# Patient Record
Sex: FEMALE | Race: WHITE | NOT HISPANIC OR LATINO | Employment: UNEMPLOYED | URBAN - METROPOLITAN AREA
[De-identification: names, ages, dates, MRNs, and addresses within clinical notes are randomized per-mention and may not be internally consistent; named-entity substitution may affect disease eponyms.]

---

## 2023-07-07 ENCOUNTER — TELEPHONE (OUTPATIENT)
Dept: FAMILY MEDICINE CLINIC | Facility: CLINIC | Age: 33
End: 2023-07-07
Payer: COMMERCIAL

## 2024-07-26 ENCOUNTER — LAB (OUTPATIENT)
Dept: LAB | Facility: HOSPITAL | Age: 34
End: 2024-07-26

## 2024-07-26 ENCOUNTER — OFFICE VISIT (OUTPATIENT)
Dept: FAMILY MEDICINE CLINIC | Facility: CLINIC | Age: 34
End: 2024-07-26

## 2024-07-26 VITALS
SYSTOLIC BLOOD PRESSURE: 110 MMHG | OXYGEN SATURATION: 98 % | WEIGHT: 122.2 LBS | DIASTOLIC BLOOD PRESSURE: 76 MMHG | BODY MASS INDEX: 23.07 KG/M2 | HEIGHT: 61 IN | HEART RATE: 97 BPM

## 2024-07-26 DIAGNOSIS — N64.4 BREAST PAIN IN FEMALE: ICD-10-CM

## 2024-07-26 DIAGNOSIS — D22.61 NEVUS OF RIGHT FOREARM: ICD-10-CM

## 2024-07-26 DIAGNOSIS — N63.25 MASS OVERLAPPING MULTIPLE QUADRANTS OF LEFT BREAST: ICD-10-CM

## 2024-07-26 DIAGNOSIS — R94.6 ABNORMAL THYROID FUNCTION TEST: ICD-10-CM

## 2024-07-26 DIAGNOSIS — N63.10 MASS OF RIGHT BREAST, UNSPECIFIED QUADRANT: Primary | ICD-10-CM

## 2024-07-26 PROCEDURE — 86800 THYROGLOBULIN ANTIBODY: CPT

## 2024-07-26 PROCEDURE — 99214 OFFICE O/P EST MOD 30 MIN: CPT | Performed by: PHYSICIAN ASSISTANT

## 2024-07-26 PROCEDURE — 84439 ASSAY OF FREE THYROXINE: CPT

## 2024-07-26 PROCEDURE — 86376 MICROSOMAL ANTIBODY EACH: CPT

## 2024-07-26 PROCEDURE — 84443 ASSAY THYROID STIM HORMONE: CPT

## 2024-07-26 RX ORDER — CALCIUM CARBONATE 500 MG/1
1 TABLET, CHEWABLE ORAL DAILY PRN
COMMUNITY

## 2024-07-27 LAB
T4 FREE SERPL-MCNC: 1.06 NG/DL (ref 0.92–1.68)
TSH SERPL DL<=0.05 MIU/L-ACNC: 1.53 UIU/ML (ref 0.27–4.2)

## 2024-07-28 LAB — THYROPEROXIDASE AB SERPL-ACNC: <9 IU/ML (ref 0–34)

## 2024-07-30 LAB — THYROGLOB AB SERPL-ACNC: <1 IU/ML (ref 0–0.9)

## 2024-08-08 ENCOUNTER — TRANSCRIBE ORDERS (OUTPATIENT)
Dept: ADMINISTRATIVE | Facility: HOSPITAL | Age: 34
End: 2024-08-08

## 2024-08-08 ENCOUNTER — HOSPITAL ENCOUNTER (OUTPATIENT)
Facility: HOSPITAL | Age: 34
Discharge: HOME | End: 2024-08-08

## 2024-08-08 DIAGNOSIS — N63.25 MASS OVERLAPPING MULTIPLE QUADRANTS OF LEFT BREAST: ICD-10-CM

## 2024-08-08 DIAGNOSIS — N63.10 MASS OF RIGHT BREAST, UNSPECIFIED QUADRANT: ICD-10-CM

## 2024-08-08 DIAGNOSIS — N64.4 BREAST PAIN IN FEMALE: ICD-10-CM

## 2024-08-08 DIAGNOSIS — R92.8 ABNORMAL MAMMOGRAM: Primary | ICD-10-CM

## 2024-08-08 LAB
NCCN CRITERIA FLAG: NORMAL
TYRER CUZICK SCORE: 17.3

## 2024-08-08 PROCEDURE — 76642 ULTRASOUND BREAST LIMITED: CPT

## 2024-08-08 PROCEDURE — 77066 DX MAMMO INCL CAD BI: CPT

## 2024-08-08 PROCEDURE — G0279 TOMOSYNTHESIS, MAMMO: HCPCS

## 2024-08-28 ENCOUNTER — HOSPITAL ENCOUNTER (OUTPATIENT)
Facility: HOSPITAL | Age: 34
Discharge: HOME | End: 2024-08-28

## 2024-08-28 DIAGNOSIS — R92.8 ABNORMAL MAMMOGRAM: ICD-10-CM

## 2024-08-28 PROCEDURE — 25010000002 LIDOCAINE 1 % SOLUTION: Performed by: RADIOLOGY

## 2024-08-28 PROCEDURE — A4648 IMPLANTABLE TISSUE MARKER: HCPCS

## 2024-08-28 PROCEDURE — 88305 TISSUE EXAM BY PATHOLOGIST: CPT | Performed by: FAMILY MEDICINE

## 2024-08-28 RX ORDER — LIDOCAINE HYDROCHLORIDE AND EPINEPHRINE 10; 10 MG/ML; UG/ML
10 INJECTION, SOLUTION INFILTRATION; PERINEURAL ONCE
Status: COMPLETED | OUTPATIENT
Start: 2024-08-28 | End: 2024-08-28

## 2024-08-28 RX ORDER — LIDOCAINE HYDROCHLORIDE 10 MG/ML
5 INJECTION, SOLUTION INFILTRATION; PERINEURAL ONCE
Status: COMPLETED | OUTPATIENT
Start: 2024-08-28 | End: 2024-08-28

## 2024-08-28 RX ADMIN — LIDOCAINE HYDROCHLORIDE,EPINEPHRINE BITARTRATE 7 ML: 10; .01 INJECTION, SOLUTION INFILTRATION; PERINEURAL at 14:32

## 2024-08-28 RX ADMIN — LIDOCAINE HYDROCHLORIDE,EPINEPHRINE BITARTRATE 3 ML: 10; .01 INJECTION, SOLUTION INFILTRATION; PERINEURAL at 14:55

## 2024-08-28 RX ADMIN — LIDOCAINE HYDROCHLORIDE 4 ML: 10 INJECTION, SOLUTION INFILTRATION; PERINEURAL at 14:43

## 2024-08-28 RX ADMIN — LIDOCAINE HYDROCHLORIDE 5 ML: 10 INJECTION, SOLUTION INFILTRATION; PERINEURAL at 14:08

## 2024-08-30 ENCOUNTER — TELEPHONE (OUTPATIENT)
Facility: HOSPITAL | Age: 34
End: 2024-08-30

## 2024-08-30 LAB
CYTO UR: NORMAL
CYTO UR: NORMAL
LAB AP CASE REPORT: NORMAL
LAB AP CASE REPORT: NORMAL
LAB AP CLINICAL INFORMATION: NORMAL
LAB AP CLINICAL INFORMATION: NORMAL
LAB AP DIAGNOSIS COMMENT: NORMAL
LAB AP DIAGNOSIS COMMENT: NORMAL
PATH REPORT.FINAL DX SPEC: NORMAL
PATH REPORT.FINAL DX SPEC: NORMAL
PATH REPORT.GROSS SPEC: NORMAL
PATH REPORT.GROSS SPEC: NORMAL

## 2024-09-06 ENCOUNTER — TELEPHONE (OUTPATIENT)
Dept: FAMILY MEDICINE CLINIC | Facility: CLINIC | Age: 34
End: 2024-09-06

## 2024-09-09 ENCOUNTER — TELEPHONE (OUTPATIENT)
Dept: FAMILY MEDICINE CLINIC | Facility: CLINIC | Age: 34
End: 2024-09-09

## 2024-09-25 ENCOUNTER — APPOINTMENT (OUTPATIENT)
Dept: CT IMAGING | Facility: HOSPITAL | Age: 34
End: 2024-09-25

## 2024-09-25 ENCOUNTER — HOSPITAL ENCOUNTER (EMERGENCY)
Facility: HOSPITAL | Age: 34
Discharge: HOME | End: 2024-09-25
Attending: EMERGENCY MEDICINE

## 2024-09-25 VITALS
BODY MASS INDEX: 23.06 KG/M2 | TEMPERATURE: 98.7 F | HEIGHT: 61 IN | RESPIRATION RATE: 16 BRPM | WEIGHT: 122.14 LBS | OXYGEN SATURATION: 99 % | HEART RATE: 68 BPM | DIASTOLIC BLOOD PRESSURE: 77 MMHG | SYSTOLIC BLOOD PRESSURE: 113 MMHG

## 2024-09-25 DIAGNOSIS — K29.70 GASTRITIS, PRESENCE OF BLEEDING UNSPECIFIED, UNSPECIFIED CHRONICITY, UNSPECIFIED GASTRITIS TYPE: ICD-10-CM

## 2024-09-25 DIAGNOSIS — K04.7 DENTAL INFECTION: ICD-10-CM

## 2024-09-25 DIAGNOSIS — R10.13 EPIGASTRIC PAIN: Primary | ICD-10-CM

## 2024-09-25 LAB
ALBUMIN SERPL-MCNC: 4.6 G/DL (ref 3.5–5.2)
ALBUMIN/GLOB SERPL: 1.6 G/DL
ALP SERPL-CCNC: 57 U/L (ref 39–117)
ALT SERPL W P-5'-P-CCNC: 5 U/L (ref 1–33)
ANION GAP SERPL CALCULATED.3IONS-SCNC: 11 MMOL/L (ref 5–15)
AST SERPL-CCNC: 17 U/L (ref 1–32)
BACTERIA UR QL AUTO: NORMAL /HPF
BASOPHILS # BLD MANUAL: 0 10*3/MM3 (ref 0–0.2)
BASOPHILS NFR BLD MANUAL: 0 % (ref 0–1.5)
BILIRUB SERPL-MCNC: 1.4 MG/DL (ref 0–1.2)
BILIRUB UR QL STRIP: NEGATIVE
BUN SERPL-MCNC: 8 MG/DL (ref 6–20)
BUN/CREAT SERPL: 15.1 (ref 7–25)
CALCIUM SPEC-SCNC: 8.9 MG/DL (ref 8.6–10.5)
CHLORIDE SERPL-SCNC: 102 MMOL/L (ref 98–107)
CLARITY UR: CLEAR
CLUMPED PLATELETS: PRESENT
CO2 SERPL-SCNC: 23 MMOL/L (ref 22–29)
COLOR UR: YELLOW
CREAT SERPL-MCNC: 0.53 MG/DL (ref 0.57–1)
DEPRECATED RDW RBC AUTO: 37.3 FL (ref 37–54)
EGFRCR SERPLBLD CKD-EPI 2021: 125.4 ML/MIN/1.73
EOSINOPHIL # BLD MANUAL: 0.08 10*3/MM3 (ref 0–0.4)
EOSINOPHIL NFR BLD MANUAL: 2 % (ref 0.3–6.2)
ERYTHROCYTE [DISTWIDTH] IN BLOOD BY AUTOMATED COUNT: 11.7 % (ref 12.3–15.4)
GLOBULIN UR ELPH-MCNC: 2.9 GM/DL
GLUCOSE SERPL-MCNC: 101 MG/DL (ref 65–99)
GLUCOSE UR STRIP-MCNC: NEGATIVE MG/DL
HCG INTACT+B SERPL-ACNC: <0.1 MIU/ML
HCT VFR BLD AUTO: 39.4 % (ref 34–46.6)
HGB BLD-MCNC: 12.8 G/DL (ref 12–15.9)
HGB UR QL STRIP.AUTO: NEGATIVE
HOLD SPECIMEN: NORMAL
HYALINE CASTS UR QL AUTO: NORMAL /LPF
KETONES UR QL STRIP: NEGATIVE
LEUKOCYTE ESTERASE UR QL STRIP.AUTO: ABNORMAL
LIPASE SERPL-CCNC: 25 U/L (ref 13–60)
LYMPHOCYTES # BLD MANUAL: 2.32 10*3/MM3 (ref 0.7–3.1)
LYMPHOCYTES NFR BLD MANUAL: 7 % (ref 5–12)
MCH RBC QN AUTO: 28.6 PG (ref 26.6–33)
MCHC RBC AUTO-ENTMCNC: 32.5 G/DL (ref 31.5–35.7)
MCV RBC AUTO: 87.9 FL (ref 79–97)
MONOCYTES # BLD: 0.28 10*3/MM3 (ref 0.1–0.9)
NEUTROPHILS # BLD AUTO: 1.26 10*3/MM3 (ref 1.7–7)
NEUTROPHILS NFR BLD MANUAL: 28 % (ref 42.7–76)
NEUTS BAND NFR BLD MANUAL: 4 % (ref 0–5)
NITRITE UR QL STRIP: NEGATIVE
PH UR STRIP.AUTO: 6 [PH] (ref 5–8)
PLATELET # BLD AUTO: 195 10*3/MM3 (ref 140–450)
PMV BLD AUTO: 10.9 FL (ref 6–12)
POTASSIUM SERPL-SCNC: 3.9 MMOL/L (ref 3.5–5.2)
PROT SERPL-MCNC: 7.5 G/DL (ref 6–8.5)
PROT UR QL STRIP: NEGATIVE
RBC # BLD AUTO: 4.48 10*6/MM3 (ref 3.77–5.28)
RBC # UR STRIP: NORMAL /HPF
RBC MORPH BLD: NORMAL
REF LAB TEST METHOD: NORMAL
SMUDGE CELLS BLD QL SMEAR: ABNORMAL
SODIUM SERPL-SCNC: 136 MMOL/L (ref 136–145)
SP GR UR STRIP: 1.03 (ref 1–1.03)
SQUAMOUS #/AREA URNS HPF: NORMAL /HPF
UROBILINOGEN UR QL STRIP: ABNORMAL
VARIANT LYMPHS NFR BLD MANUAL: 5 % (ref 0–5)
VARIANT LYMPHS NFR BLD MANUAL: 54 % (ref 19.6–45.3)
WBC # UR STRIP: NORMAL /HPF
WBC NRBC COR # BLD AUTO: 3.94 10*3/MM3 (ref 3.4–10.8)
WHOLE BLOOD HOLD SPECIMEN: NORMAL

## 2024-09-25 PROCEDURE — 25510000001 IOPAMIDOL 61 % SOLUTION: Performed by: EMERGENCY MEDICINE

## 2024-09-25 PROCEDURE — 36415 COLL VENOUS BLD VENIPUNCTURE: CPT

## 2024-09-25 PROCEDURE — 85025 COMPLETE CBC W/AUTO DIFF WBC: CPT | Performed by: EMERGENCY MEDICINE

## 2024-09-25 PROCEDURE — 74177 CT ABD & PELVIS W/CONTRAST: CPT

## 2024-09-25 PROCEDURE — 80053 COMPREHEN METABOLIC PANEL: CPT | Performed by: EMERGENCY MEDICINE

## 2024-09-25 PROCEDURE — 96374 THER/PROPH/DIAG INJ IV PUSH: CPT

## 2024-09-25 PROCEDURE — 25810000003 SODIUM CHLORIDE 0.9 % SOLUTION: Performed by: NURSE PRACTITIONER

## 2024-09-25 PROCEDURE — 83690 ASSAY OF LIPASE: CPT | Performed by: EMERGENCY MEDICINE

## 2024-09-25 PROCEDURE — 96375 TX/PRO/DX INJ NEW DRUG ADDON: CPT

## 2024-09-25 PROCEDURE — 99285 EMERGENCY DEPT VISIT HI MDM: CPT

## 2024-09-25 PROCEDURE — 81001 URINALYSIS AUTO W/SCOPE: CPT | Performed by: EMERGENCY MEDICINE

## 2024-09-25 PROCEDURE — 84702 CHORIONIC GONADOTROPIN TEST: CPT | Performed by: EMERGENCY MEDICINE

## 2024-09-25 PROCEDURE — 85007 BL SMEAR W/DIFF WBC COUNT: CPT | Performed by: EMERGENCY MEDICINE

## 2024-09-25 PROCEDURE — 25010000002 ONDANSETRON PER 1 MG: Performed by: NURSE PRACTITIONER

## 2024-09-25 RX ORDER — PANTOPRAZOLE SODIUM 40 MG/10ML
80 INJECTION, POWDER, LYOPHILIZED, FOR SOLUTION INTRAVENOUS ONCE
Status: COMPLETED | OUTPATIENT
Start: 2024-09-25 | End: 2024-09-25

## 2024-09-25 RX ORDER — IOPAMIDOL 612 MG/ML
100 INJECTION, SOLUTION INTRAVASCULAR
Status: COMPLETED | OUTPATIENT
Start: 2024-09-25 | End: 2024-09-25

## 2024-09-25 RX ORDER — PANTOPRAZOLE SODIUM 40 MG/1
40 TABLET, DELAYED RELEASE ORAL DAILY
Qty: 30 TABLET | Refills: 0 | Status: SHIPPED | OUTPATIENT
Start: 2024-09-25 | End: 2024-10-25

## 2024-09-25 RX ORDER — SUCRALFATE 1 G/1
1 TABLET ORAL 4 TIMES DAILY
Qty: 40 TABLET | Refills: 0 | Status: SHIPPED | OUTPATIENT
Start: 2024-09-25 | End: 2024-10-05

## 2024-09-25 RX ORDER — AMOXICILLIN AND CLAVULANATE POTASSIUM 400; 57 MG/1; MG/1
1 TABLET, CHEWABLE ORAL 2 TIMES DAILY
Qty: 20 TABLET | Refills: 0 | Status: SHIPPED | OUTPATIENT
Start: 2024-09-25 | End: 2024-10-05

## 2024-09-25 RX ORDER — SACCHAROMYCES BOULARDII 250 MG
250 CAPSULE ORAL 2 TIMES DAILY
Qty: 20 CAPSULE | Refills: 0 | Status: SHIPPED | OUTPATIENT
Start: 2024-09-25 | End: 2024-10-05

## 2024-09-25 RX ORDER — SODIUM CHLORIDE 0.9 % (FLUSH) 0.9 %
10 SYRINGE (ML) INJECTION AS NEEDED
Status: DISCONTINUED | OUTPATIENT
Start: 2024-09-25 | End: 2024-09-25 | Stop reason: HOSPADM

## 2024-09-25 RX ORDER — SODIUM CHLORIDE 9 MG/ML
10 INJECTION, SOLUTION INTRAMUSCULAR; INTRAVENOUS; SUBCUTANEOUS AS NEEDED
Status: DISCONTINUED | OUTPATIENT
Start: 2024-09-25 | End: 2024-09-25 | Stop reason: HOSPADM

## 2024-09-25 RX ORDER — ONDANSETRON 2 MG/ML
4 INJECTION INTRAMUSCULAR; INTRAVENOUS ONCE
Status: COMPLETED | OUTPATIENT
Start: 2024-09-25 | End: 2024-09-25

## 2024-09-25 RX ADMIN — SODIUM CHLORIDE 1000 ML: 9 INJECTION, SOLUTION INTRAVENOUS at 08:48

## 2024-09-25 RX ADMIN — PANTOPRAZOLE SODIUM 80 MG: 40 INJECTION, POWDER, FOR SOLUTION INTRAVENOUS at 08:49

## 2024-09-25 RX ADMIN — ONDANSETRON 4 MG: 2 INJECTION INTRAMUSCULAR; INTRAVENOUS at 08:49

## 2024-09-25 RX ADMIN — IOPAMIDOL 85 ML: 612 INJECTION, SOLUTION INTRAVENOUS at 09:33

## 2024-11-06 ENCOUNTER — HOSPITAL ENCOUNTER (OUTPATIENT)
Dept: MRI IMAGING | Facility: HOSPITAL | Age: 34
Discharge: HOME | End: 2024-11-06
Admitting: FAMILY MEDICINE

## 2024-11-06 DIAGNOSIS — R92.8 ABNORMAL FINDINGS ON DIAGNOSTIC IMAGING OF BREAST: ICD-10-CM

## 2024-11-06 PROCEDURE — 0 GADOBENATE DIMEGLUMINE 529 MG/ML SOLUTION: Performed by: FAMILY MEDICINE

## 2024-11-06 PROCEDURE — A9577 INJ MULTIHANCE: HCPCS | Performed by: FAMILY MEDICINE

## 2024-11-06 PROCEDURE — 77049 MRI BREAST C-+ W/CAD BI: CPT

## 2024-11-06 RX ADMIN — GADOBENATE DIMEGLUMINE 10 ML: 529 INJECTION, SOLUTION INTRAVENOUS at 14:49

## 2024-11-11 ENCOUNTER — TELEPHONE (OUTPATIENT)
Dept: MRI IMAGING | Facility: HOSPITAL | Age: 34
End: 2024-11-11

## 2024-11-11 NOTE — TELEPHONE ENCOUNTER
I have left a message on the patient's voicemail to go over recommendations from breast MRI and to get that scheduled.

## 2024-11-14 ENCOUNTER — APPOINTMENT (OUTPATIENT)
Dept: CT IMAGING | Facility: HOSPITAL | Age: 34
End: 2024-11-14

## 2024-11-14 ENCOUNTER — TELEPHONE (OUTPATIENT)
Dept: MRI IMAGING | Facility: HOSPITAL | Age: 34
End: 2024-11-14

## 2024-11-14 ENCOUNTER — HOSPITAL ENCOUNTER (EMERGENCY)
Facility: HOSPITAL | Age: 34
Discharge: HOME | End: 2024-11-14
Attending: EMERGENCY MEDICINE | Admitting: EMERGENCY MEDICINE

## 2024-11-14 VITALS
RESPIRATION RATE: 16 BRPM | SYSTOLIC BLOOD PRESSURE: 103 MMHG | WEIGHT: 120 LBS | OXYGEN SATURATION: 99 % | HEART RATE: 68 BPM | TEMPERATURE: 98.1 F | BODY MASS INDEX: 22.66 KG/M2 | HEIGHT: 61 IN | DIASTOLIC BLOOD PRESSURE: 69 MMHG

## 2024-11-14 DIAGNOSIS — R11.0 NAUSEA: ICD-10-CM

## 2024-11-14 DIAGNOSIS — K29.00 ACUTE SUPERFICIAL GASTRITIS WITHOUT HEMORRHAGE: Primary | ICD-10-CM

## 2024-11-14 DIAGNOSIS — R10.84 GENERALIZED ABDOMINAL PAIN: ICD-10-CM

## 2024-11-14 LAB
ALBUMIN SERPL-MCNC: 4.4 G/DL (ref 3.5–5.2)
ALBUMIN/GLOB SERPL: 1.6 G/DL
ALP SERPL-CCNC: 62 U/L (ref 39–117)
ALT SERPL W P-5'-P-CCNC: 6 U/L (ref 1–33)
ANION GAP SERPL CALCULATED.3IONS-SCNC: 11 MMOL/L (ref 5–15)
AST SERPL-CCNC: 12 U/L (ref 1–32)
B-HCG UR QL: NEGATIVE
BACTERIA UR QL AUTO: NORMAL /HPF
BASOPHILS # BLD AUTO: 0.03 10*3/MM3 (ref 0–0.2)
BASOPHILS NFR BLD AUTO: 0.7 % (ref 0–1.5)
BILIRUB SERPL-MCNC: 0.9 MG/DL (ref 0–1.2)
BILIRUB UR QL STRIP: NEGATIVE
BUN SERPL-MCNC: 10 MG/DL (ref 6–20)
BUN/CREAT SERPL: 16.1 (ref 7–25)
CALCIUM SPEC-SCNC: 8.7 MG/DL (ref 8.6–10.5)
CHLORIDE SERPL-SCNC: 107 MMOL/L (ref 98–107)
CLARITY UR: ABNORMAL
CO2 SERPL-SCNC: 23 MMOL/L (ref 22–29)
COLOR UR: YELLOW
CREAT SERPL-MCNC: 0.62 MG/DL (ref 0.57–1)
DEPRECATED RDW RBC AUTO: 38.2 FL (ref 37–54)
EGFRCR SERPLBLD CKD-EPI 2021: 120.8 ML/MIN/1.73
EOSINOPHIL # BLD AUTO: 0.09 10*3/MM3 (ref 0–0.4)
EOSINOPHIL NFR BLD AUTO: 2 % (ref 0.3–6.2)
ERYTHROCYTE [DISTWIDTH] IN BLOOD BY AUTOMATED COUNT: 11.9 % (ref 12.3–15.4)
EXPIRATION DATE: NORMAL
GLOBULIN UR ELPH-MCNC: 2.8 GM/DL
GLUCOSE SERPL-MCNC: 91 MG/DL (ref 65–99)
GLUCOSE UR STRIP-MCNC: NEGATIVE MG/DL
HCG INTACT+B SERPL-ACNC: <0.1 MIU/ML
HCT VFR BLD AUTO: 39.2 % (ref 34–46.6)
HGB BLD-MCNC: 12.6 G/DL (ref 12–15.9)
HGB UR QL STRIP.AUTO: NEGATIVE
HOLD SPECIMEN: NORMAL
HYALINE CASTS UR QL AUTO: NORMAL /LPF
IMM GRANULOCYTES # BLD AUTO: 0.01 10*3/MM3 (ref 0–0.05)
IMM GRANULOCYTES NFR BLD AUTO: 0.2 % (ref 0–0.5)
INTERNAL NEGATIVE CONTROL: NORMAL
INTERNAL POSITIVE CONTROL: NORMAL
KETONES UR QL STRIP: NEGATIVE
LEUKOCYTE ESTERASE UR QL STRIP.AUTO: ABNORMAL
LIPASE SERPL-CCNC: 26 U/L (ref 13–60)
LYMPHOCYTES # BLD AUTO: 2.67 10*3/MM3 (ref 0.7–3.1)
LYMPHOCYTES NFR BLD AUTO: 59.1 % (ref 19.6–45.3)
Lab: NORMAL
MCH RBC QN AUTO: 28.6 PG (ref 26.6–33)
MCHC RBC AUTO-ENTMCNC: 32.1 G/DL (ref 31.5–35.7)
MCV RBC AUTO: 89.1 FL (ref 79–97)
MONOCYTES # BLD AUTO: 0.35 10*3/MM3 (ref 0.1–0.9)
MONOCYTES NFR BLD AUTO: 7.7 % (ref 5–12)
NEUTROPHILS NFR BLD AUTO: 1.37 10*3/MM3 (ref 1.7–7)
NEUTROPHILS NFR BLD AUTO: 30.3 % (ref 42.7–76)
NITRITE UR QL STRIP: NEGATIVE
NRBC BLD AUTO-RTO: 0 /100 WBC (ref 0–0.2)
PH UR STRIP.AUTO: 5.5 [PH] (ref 5–8)
PLATELET # BLD AUTO: 312 10*3/MM3 (ref 140–450)
PMV BLD AUTO: 9.7 FL (ref 6–12)
POTASSIUM SERPL-SCNC: 3.9 MMOL/L (ref 3.5–5.2)
PROT SERPL-MCNC: 7.2 G/DL (ref 6–8.5)
PROT UR QL STRIP: NEGATIVE
RBC # BLD AUTO: 4.4 10*6/MM3 (ref 3.77–5.28)
RBC # UR STRIP: NORMAL /HPF
REF LAB TEST METHOD: NORMAL
SODIUM SERPL-SCNC: 141 MMOL/L (ref 136–145)
SP GR UR STRIP: 1.01 (ref 1–1.03)
SQUAMOUS #/AREA URNS HPF: NORMAL /HPF
UROBILINOGEN UR QL STRIP: ABNORMAL
WBC # UR STRIP: NORMAL /HPF
WBC NRBC COR # BLD AUTO: 4.52 10*3/MM3 (ref 3.4–10.8)
WHOLE BLOOD HOLD COAG: NORMAL
WHOLE BLOOD HOLD SPECIMEN: NORMAL

## 2024-11-14 PROCEDURE — 96374 THER/PROPH/DIAG INJ IV PUSH: CPT

## 2024-11-14 PROCEDURE — 25010000002 KETOROLAC TROMETHAMINE PER 15 MG: Performed by: EMERGENCY MEDICINE

## 2024-11-14 PROCEDURE — 81001 URINALYSIS AUTO W/SCOPE: CPT | Performed by: EMERGENCY MEDICINE

## 2024-11-14 PROCEDURE — 96375 TX/PRO/DX INJ NEW DRUG ADDON: CPT

## 2024-11-14 PROCEDURE — 85025 COMPLETE CBC W/AUTO DIFF WBC: CPT | Performed by: EMERGENCY MEDICINE

## 2024-11-14 PROCEDURE — 99284 EMERGENCY DEPT VISIT MOD MDM: CPT

## 2024-11-14 PROCEDURE — 74176 CT ABD & PELVIS W/O CONTRAST: CPT

## 2024-11-14 PROCEDURE — 80053 COMPREHEN METABOLIC PANEL: CPT | Performed by: EMERGENCY MEDICINE

## 2024-11-14 PROCEDURE — 83690 ASSAY OF LIPASE: CPT | Performed by: EMERGENCY MEDICINE

## 2024-11-14 PROCEDURE — 25010000002 ONDANSETRON PER 1 MG: Performed by: EMERGENCY MEDICINE

## 2024-11-14 PROCEDURE — 81025 URINE PREGNANCY TEST: CPT | Performed by: EMERGENCY MEDICINE

## 2024-11-14 PROCEDURE — 84702 CHORIONIC GONADOTROPIN TEST: CPT | Performed by: EMERGENCY MEDICINE

## 2024-11-14 RX ORDER — ONDANSETRON 2 MG/ML
4 INJECTION INTRAMUSCULAR; INTRAVENOUS ONCE
Status: COMPLETED | OUTPATIENT
Start: 2024-11-14 | End: 2024-11-14

## 2024-11-14 RX ORDER — SODIUM CHLORIDE 9 MG/ML
10 INJECTION, SOLUTION INTRAMUSCULAR; INTRAVENOUS; SUBCUTANEOUS AS NEEDED
Status: DISCONTINUED | OUTPATIENT
Start: 2024-11-14 | End: 2024-11-14 | Stop reason: HOSPADM

## 2024-11-14 RX ORDER — FAMOTIDINE 10 MG/ML
20 INJECTION, SOLUTION INTRAVENOUS ONCE
Status: COMPLETED | OUTPATIENT
Start: 2024-11-14 | End: 2024-11-14

## 2024-11-14 RX ORDER — KETOROLAC TROMETHAMINE 15 MG/ML
15 INJECTION, SOLUTION INTRAMUSCULAR; INTRAVENOUS ONCE
Status: COMPLETED | OUTPATIENT
Start: 2024-11-14 | End: 2024-11-14

## 2024-11-14 RX ORDER — MORPHINE SULFATE 4 MG/ML
4 INJECTION, SOLUTION INTRAMUSCULAR; INTRAVENOUS ONCE
Status: DISCONTINUED | OUTPATIENT
Start: 2024-11-14 | End: 2024-11-14 | Stop reason: HOSPADM

## 2024-11-14 RX ORDER — FAMOTIDINE 20 MG/1
20 TABLET, FILM COATED ORAL 2 TIMES DAILY
Qty: 28 TABLET | Refills: 0 | Status: SHIPPED | OUTPATIENT
Start: 2024-11-14 | End: 2024-11-28

## 2024-11-14 RX ADMIN — KETOROLAC TROMETHAMINE 15 MG: 15 INJECTION, SOLUTION INTRAMUSCULAR; INTRAVENOUS at 04:26

## 2024-11-14 RX ADMIN — ONDANSETRON 4 MG: 2 INJECTION INTRAMUSCULAR; INTRAVENOUS at 04:26

## 2024-11-14 RX ADMIN — FAMOTIDINE 20 MG: 10 INJECTION, SOLUTION INTRAVENOUS at 04:50

## 2024-11-14 NOTE — TELEPHONE ENCOUNTER
Patient was recommended from her MRI Breast for a left 2nd look ultrasound. Scheduled for 11/26/2024 at 8:00 with 7:45 arrival at [address] [place] [address]. No BT. Encouraged to call with any further questions.

## 2024-11-14 NOTE — ED PROVIDER NOTES
Subjective   History of Present Illness  Is a 34-year-old female presented to the emergency department with some right flank pain.  Patient states that it started about an hour ago.  It woke her from sleep.  She has some sharp stabbing pain.  Radiates from her right flank down to her groin.  Denies any dysuria or hematuria.  She has had some mild nausea, no vomiting.  Denies any fevers or chills.  No headache or change in vision.  No focal weakness.  No chest pain.    History provided by:  Patient   used: No        Review of Systems   Constitutional:  Negative for chills and fever.   HENT:  Negative for congestion, ear pain and sore throat.    Eyes:  Negative for visual disturbance.   Respiratory:  Negative for shortness of breath.    Cardiovascular:  Negative for chest pain.   Gastrointestinal:  Positive for abdominal pain and nausea.   Genitourinary:  Negative for difficulty urinating.   Musculoskeletal:  Negative for arthralgias.   Skin:  Negative for rash.   Neurological:  Negative for dizziness, weakness and numbness.   Psychiatric/Behavioral:  Negative for agitation.        Past Medical History:   Diagnosis Date    GERD (gastroesophageal reflux disease)     Irritable bowel syndrome     Peptic ulceration        No Known Allergies    Past Surgical History:   Procedure Laterality Date    TONSILLECTOMY         Family History   Problem Relation Age of Onset    Heart disease Mother     Hyperlipidemia Mother     Diabetes Father     Hyperlipidemia Father     Breast cancer Paternal Aunt        Social History     Socioeconomic History    Marital status: Single   Tobacco Use    Smoking status: Never    Smokeless tobacco: Never   Vaping Use    Vaping status: Never Used   Substance and Sexual Activity    Alcohol use: Never    Drug use: Never           Objective   Physical Exam  Vitals and nursing note reviewed.   Constitutional:       General: She is not in acute distress.     Appearance: She is not  ill-appearing or toxic-appearing.   HENT:      Mouth/Throat:      Pharynx: No posterior oropharyngeal erythema.   Eyes:      Conjunctiva/sclera: Conjunctivae normal.      Pupils: Pupils are equal, round, and reactive to light.   Cardiovascular:      Rate and Rhythm: Normal rate and regular rhythm.   Pulmonary:      Effort: Pulmonary effort is normal. No respiratory distress.   Abdominal:      General: Abdomen is flat. There is no distension.      Palpations: There is no mass.      Tenderness: There is no abdominal tenderness. There is right CVA tenderness. There is no guarding or rebound.   Musculoskeletal:         General: No deformity. Normal range of motion.   Skin:     General: Skin is warm.      Findings: No rash.   Neurological:      General: No focal deficit present.      Mental Status: She is alert and oriented to person, place, and time.      Motor: No weakness.         Procedures           ED Course  ED Course as of 11/14/24 0641   Thu Nov 14, 2024   0440 BP: 122/83 [JK]   0440 Temp: 98.1 °F (36.7 °C) [JK]   0440 Temp src: Oral [JK]   0440 Heart Rate: 75 [JK]   0440 Resp: 18 [JK]   0440 SpO2: 100 % [JK]   0440 Device (Oxygen Therapy): room air  Interpretation:  Patient's repeat vitals, telemetry tracing, and pulse oximetry tracing were directly viewed and interpreted by myself.   O2 sat 100% on room air, interpreted as normal  Telemetry rhythm strip revealed a rate of 75 bpm, interpreted as normal sinus rhythm [JK]   0522 Patient refused urine sample [JK]   0610 Comprehensive Metabolic Panel [JK]   0610 POC Urine Pregnancy [JK]   0610 hCG, Quantitative, Pregnancy [JK]   0610 Lipase [JK]   0610 CBC & Differential(!)  Interpretation:  Laboratory studies were reviewed and interpreted directly by myself.  CBC was unremarkable, CMP normal, lipase normal, pregnancy was negative [JK]   0610 CT Abdomen Pelvis Without Contrast  Interpretation:  Imaging was directly visualized by myself, per my interpretations, CT  abdomen pelvis was unremarkable. [JK]   0640 On reevaluation, the patient states that they are feeling much better.  Patient tolerated by mouth challenge without difficulty.  They are not complaining of any new abdominal pain.  Repeat examination did not show any significant guarding or rebound.  No evidence of peritonitis.  No acute no tenderness.  Patient was given strict return precautions for acute abdomen.  They need to be reevaluated within 24 hours for acute abdomen by PCP or return to the emergency department for reexamination.     [JK]   0640 I had a discussion with the patient/family regarding diagnosis, diagnostic results, treatment plan, and medications. The patient/family indicated understanding of these instructions. I spent adequate time at the bedside prior to discharge necessary to discuss the aftercare instructions, giving patient education, providing explanations of the results of our evaluations/findings, and my decision making to assure that the patient/family understand the plan of care. Time was allotted to answer questions at that time and throughout the ED course. Patient is required to maintain timely follow up, as discussed. I also discussed the potential for the development of an acute emergent condition requiring further evaluation, return to the ER, admission, or even surgical intervention.  I encouraged the patient to return to the emergency department immediately for any concerns, worsening symptoms, new complaints, or if symptoms persist and they are unable to seek follow-up in a timely fashion. The patient/family expressed understanding and agreement with this plan    Shared decision making:   After full review of the patient's clinical presentation, review of any work-up including but not limited to laboratory studies and radiology obtained, I had a discussion with the patient.  Treatment options were discussed as well as the risks, benefits and consequences.  I discussed all  findings with the patient and family members if available.  During the discussion, treatment goals were understood by all as well as any misconceptions which were addressed with the patient.  Ample time was given for any questions they may have had.  They are in agreement with the treatment plan as well as final disposition. [JK]      ED Course User Index  [JK] Mahesh Wallace MD                    No data recorded                             Medical Decision Making  This is a 34-year-old female with a history of GERD presenting to the emergency department some right flank pain.  Findings are consistent with acute nephrolithiasis.  Patient has no evidence of acute abdomen or peritonitis on exam.  Overall, the patient is nontoxic.  Afebrile.  IV access was established and the patient.  Placed on continuous telemetry monitoring.  Given the patient's presentation, differential is broad and will require further evaluation.  Workup initiated.      Differential diagnosis: Peptic ulcer disease, dyspepsia, GERD, pancreatitis, biliary colic, electrolyte abnormality, acute kidney injury, nephrolithiasis      Amount and/or Complexity of Data Reviewed  External Data Reviewed: labs, radiology, ECG and notes.     Details: External laboratories, imaging as well as notes were reviewed personally by myself.  All relevant studies were used to guide decision making.     Date of previous record: 7/3/2023    Source of note: Primary physician    Summary:  Patient was seen and evaluated for routine visit.  I did review basic laboratory studies on file as well as a previous chest x-ray and EKG.  Records reviewed    Labs: ordered. Decision-making details documented in ED Course.  Radiology: ordered and independent interpretation performed. Decision-making details documented in ED Course.    Risk  Prescription drug management.        Final diagnoses:   Acute superficial gastritis without hemorrhage   Generalized abdominal pain   Nausea        ED Disposition  ED Disposition       ED Disposition   Discharge    Condition   Stable    Comment   --               Skip Burroughs DO  [address]  [place] [address]  [phone]    Call in 1 day           Medication List        New Prescriptions      famotidine 20 MG tablet  Commonly known as: PEPCID  Take 1 tablet by mouth 2 (Two) Times a Day for 14 days.     magnesium hydroxide 400 MG/5ML suspension  Commonly known as: MILK OF MAGNESIA  Take 5 mL by mouth Daily As Needed for Heartburn for up to 10 days.               Where to Get Your Medications        These medications were sent to [facility] [id number] - [place], [place] - [address] AT [facility] [address] - [phone] PH - [phone] FX  [address], [place] [id number]      Phone: [phone]   famotidine 20 MG tablet  magnesium hydroxide 400 MG/5ML suspension            Mahesh Wallace MD  11/14/24 0676

## 2024-11-18 ENCOUNTER — LAB (OUTPATIENT)
Dept: LAB | Facility: HOSPITAL | Age: 34
End: 2024-11-18

## 2024-11-18 ENCOUNTER — OFFICE VISIT (OUTPATIENT)
Dept: GASTROENTEROLOGY | Facility: CLINIC | Age: 34
End: 2024-11-18

## 2024-11-18 VITALS
OXYGEN SATURATION: 99 % | TEMPERATURE: 98 F | SYSTOLIC BLOOD PRESSURE: 108 MMHG | BODY MASS INDEX: 23.03 KG/M2 | HEART RATE: 103 BPM | WEIGHT: 122 LBS | DIASTOLIC BLOOD PRESSURE: 60 MMHG | HEIGHT: 61 IN

## 2024-11-18 DIAGNOSIS — K58.9 IRRITABLE BOWEL SYNDROME, UNSPECIFIED TYPE: ICD-10-CM

## 2024-11-18 DIAGNOSIS — K21.9 GASTROESOPHAGEAL REFLUX DISEASE, UNSPECIFIED WHETHER ESOPHAGITIS PRESENT: ICD-10-CM

## 2024-11-18 DIAGNOSIS — Z87.11 HISTORY OF PEPTIC ULCER: ICD-10-CM

## 2024-11-18 DIAGNOSIS — R10.30 LOWER ABDOMINAL PAIN: ICD-10-CM

## 2024-11-18 DIAGNOSIS — Z86.19 HISTORY OF HELICOBACTER PYLORI INFECTION: ICD-10-CM

## 2024-11-18 DIAGNOSIS — R11.0 NAUSEA: ICD-10-CM

## 2024-11-18 DIAGNOSIS — K21.9 GASTROESOPHAGEAL REFLUX DISEASE, UNSPECIFIED WHETHER ESOPHAGITIS PRESENT: Primary | ICD-10-CM

## 2024-11-18 LAB
ALBUMIN SERPL-MCNC: 4.6 G/DL (ref 3.5–5.2)
ALBUMIN/GLOB SERPL: 1.5 G/DL
ALP SERPL-CCNC: 60 U/L (ref 39–117)
ALT SERPL W P-5'-P-CCNC: 8 U/L (ref 1–33)
ANION GAP SERPL CALCULATED.3IONS-SCNC: 16 MMOL/L (ref 5–15)
AST SERPL-CCNC: 13 U/L (ref 1–32)
BASOPHILS # BLD AUTO: 0.03 10*3/MM3 (ref 0–0.2)
BASOPHILS NFR BLD AUTO: 0.8 % (ref 0–1.5)
BILIRUB SERPL-MCNC: 1 MG/DL (ref 0–1.2)
BUN SERPL-MCNC: 10 MG/DL (ref 6–20)
BUN/CREAT SERPL: 15.4 (ref 7–25)
CALCIUM SPEC-SCNC: 9.1 MG/DL (ref 8.6–10.5)
CHLORIDE SERPL-SCNC: 104 MMOL/L (ref 98–107)
CO2 SERPL-SCNC: 21 MMOL/L (ref 22–29)
CREAT SERPL-MCNC: 0.65 MG/DL (ref 0.57–1)
CRP SERPL-MCNC: <0.3 MG/DL (ref 0–0.5)
DEPRECATED RDW RBC AUTO: 35.2 FL (ref 37–54)
EGFRCR SERPLBLD CKD-EPI 2021: 119.4 ML/MIN/1.73
EOSINOPHIL # BLD AUTO: 0.09 10*3/MM3 (ref 0–0.4)
EOSINOPHIL NFR BLD AUTO: 2.3 % (ref 0.3–6.2)
ERYTHROCYTE [DISTWIDTH] IN BLOOD BY AUTOMATED COUNT: 11.5 % (ref 12.3–15.4)
GLOBULIN UR ELPH-MCNC: 3 GM/DL
GLUCOSE SERPL-MCNC: 89 MG/DL (ref 65–99)
HCT VFR BLD AUTO: 39.3 % (ref 34–46.6)
HGB BLD-MCNC: 12.9 G/DL (ref 12–15.9)
IMM GRANULOCYTES # BLD AUTO: 0.01 10*3/MM3 (ref 0–0.05)
IMM GRANULOCYTES NFR BLD AUTO: 0.3 % (ref 0–0.5)
LYMPHOCYTES # BLD AUTO: 1.95 10*3/MM3 (ref 0.7–3.1)
LYMPHOCYTES NFR BLD AUTO: 49.9 % (ref 19.6–45.3)
MAGNESIUM SERPL-MCNC: 2.2 MG/DL (ref 1.6–2.6)
MCH RBC QN AUTO: 28.2 PG (ref 26.6–33)
MCHC RBC AUTO-ENTMCNC: 32.8 G/DL (ref 31.5–35.7)
MCV RBC AUTO: 85.8 FL (ref 79–97)
MONOCYTES # BLD AUTO: 0.36 10*3/MM3 (ref 0.1–0.9)
MONOCYTES NFR BLD AUTO: 9.2 % (ref 5–12)
NEUTROPHILS NFR BLD AUTO: 1.47 10*3/MM3 (ref 1.7–7)
NEUTROPHILS NFR BLD AUTO: 37.5 % (ref 42.7–76)
NRBC BLD AUTO-RTO: 0 /100 WBC (ref 0–0.2)
PLATELET # BLD AUTO: 352 10*3/MM3 (ref 140–450)
PMV BLD AUTO: 10.7 FL (ref 6–12)
POTASSIUM SERPL-SCNC: 3.9 MMOL/L (ref 3.5–5.2)
PROT SERPL-MCNC: 7.6 G/DL (ref 6–8.5)
RBC # BLD AUTO: 4.58 10*6/MM3 (ref 3.77–5.28)
SODIUM SERPL-SCNC: 141 MMOL/L (ref 136–145)
TSH SERPL DL<=0.05 MIU/L-ACNC: 0.93 UIU/ML (ref 0.27–4.2)
WBC NRBC COR # BLD AUTO: 3.91 10*3/MM3 (ref 3.4–10.8)

## 2024-11-18 PROCEDURE — 86258 DGP ANTIBODY EACH IG CLASS: CPT

## 2024-11-18 PROCEDURE — 82784 ASSAY IGA/IGD/IGG/IGM EACH: CPT

## 2024-11-18 PROCEDURE — 82306 VITAMIN D 25 HYDROXY: CPT | Performed by: PHYSICIAN ASSISTANT

## 2024-11-18 PROCEDURE — 83735 ASSAY OF MAGNESIUM: CPT | Performed by: PHYSICIAN ASSISTANT

## 2024-11-18 PROCEDURE — 84443 ASSAY THYROID STIM HORMONE: CPT | Performed by: PHYSICIAN ASSISTANT

## 2024-11-18 PROCEDURE — 36415 COLL VENOUS BLD VENIPUNCTURE: CPT

## 2024-11-18 PROCEDURE — 86140 C-REACTIVE PROTEIN: CPT

## 2024-11-18 PROCEDURE — 86364 TISS TRNSGLTMNASE EA IG CLAS: CPT

## 2024-11-18 PROCEDURE — 85025 COMPLETE CBC W/AUTO DIFF WBC: CPT

## 2024-11-18 PROCEDURE — 80053 COMPREHEN METABOLIC PANEL: CPT | Performed by: PHYSICIAN ASSISTANT

## 2024-11-18 PROCEDURE — 86231 EMA EACH IG CLASS: CPT

## 2024-11-18 RX ORDER — SUCRALFATE 1 G/1
1 TABLET ORAL 4 TIMES DAILY
Qty: 120 TABLET | Refills: 1 | Status: SHIPPED | OUTPATIENT
Start: 2024-11-18

## 2024-11-18 NOTE — PROGRESS NOTES
New Patient Consultation     Patient Name: Reyes Peguero  : 1990   MRN: [id number]     Chief Complaint:    Chief Complaint   Patient presents with    new patient     Epigastric pain  - Gastritis, presence of bleeding unspecified, unspecified chronicity, unspecified gastritis type          History of Present Illness: Reyes Peguero is a 34 y.o. female, PMH includes GERD, IBS, who is here today for a Gastroenterology Consultation for abdominal pain.    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     She had extensive previous GI workup in [place] with h/o PUD, H Pylori, IBS. She has previously had workup to rule out IBD, but pt would like to be reevaluated. She reports epigastric pain and lower abdominal pain daily, despite use of esomeprazole 40mg a few times per week. She generally has a complete, formed BM daily. She notes stress and anxiety make her sx worse, as well as heavy / greasy foods. She had been put on mesalamine in the past without change in sx. She denies EtOH or caffeine use.     Patient denies associated fever, vomiting, diarrhea, constipation, hematemesis, dysphagia, hematochezia, melena, weight loss or gain, dysuria, jaundice or bruising.    Patient denies personal or FHx of pancreatitis, colitis, Celiac disease, UC, Crohn's disease, colon or gastric cancers. Pt denies tobacco, illicit substance or NSAID use. No previous abdominal surgeries.     CT A/P wo contrast 2024: No acute findings in the abdomen or pelvis.     EGD about 5 years ago in [facility]. No previous CSY.     Subjective      Review of Systems:   Review of Systems   Constitutional:  Negative for appetite change, chills, diaphoresis, fatigue, fever, unexpected weight gain and unexpected weight loss.   HENT:  Negative for drooling, facial swelling, mouth sores, rhinorrhea, sore throat, tinnitus, trouble swallowing and  voice change.    Eyes: Negative.    Respiratory:  Negative for cough, chest tightness and shortness of breath.    Cardiovascular:  Negative for chest pain.   Gastrointestinal:  Positive for abdominal pain (bilateral lower abdominal pain, epigastric pain), nausea, GERD and indigestion. Negative for blood in stool, constipation, diarrhea and vomiting.   Genitourinary:  Negative for dysuria, flank pain, hematuria and pelvic pain.   Musculoskeletal:  Negative for arthralgias and myalgias.   Skin:  Negative for color change, pallor and rash.   Neurological:  Negative for dizziness, tremors, syncope, weakness and numbness.   Psychiatric/Behavioral:  Negative for hallucinations and sleep disturbance. The patient is nervous/anxious.    All other systems reviewed and are negative.      Past Medical History:   Past Medical History:   Diagnosis Date    GERD (gastroesophageal reflux disease)     Irritable bowel syndrome     Peptic ulceration        Past Surgical History:   Past Surgical History:   Procedure Laterality Date    TONSILLECTOMY         Family History:   Family History   Problem Relation Age of Onset    Heart disease Mother     Hyperlipidemia Mother     Diabetes Father     Hyperlipidemia Father     Breast cancer Paternal Aunt     Colon cancer Neg Hx     Esophageal cancer Neg Hx        Social History:   Social History     Socioeconomic History    Marital status: Single   Tobacco Use    Smoking status: Never    Smokeless tobacco: Never   Vaping Use    Vaping status: Never Used   Substance and Sexual Activity    Alcohol use: Never    Drug use: Never       Alcohol/Tobacco History:   Social History     Substance and Sexual Activity   Alcohol Use Never     Social History     Tobacco Use   Smoking Status Never   Smokeless Tobacco Never       Medications:     Current Outpatient Medications:     calcium carbonate (TUMS) 500 MG chewable tablet, Chew 1 tablet Daily As Needed for Indigestion or Heartburn., Disp: , Rfl:      "famotidine (PEPCID) 20 MG tablet, Take 1 tablet by mouth 2 (Two) Times a Day for 14 days., Disp: 28 tablet, Rfl: 0    fexofenadine (Allegra Allergy) 180 MG tablet, Take 1 tablet by mouth Daily., Disp: 90 tablet, Rfl: 3    magnesium hydroxide (MILK OF MAGNESIA) 400 MG/5ML suspension, Take 5 mL by mouth Daily As Needed for Heartburn for up to 10 days., Disp: 120 mL, Rfl: 0    Allergies:   No Known Allergies    Objective     Physical Exam:  Vital Signs:   Vitals:    11/18/24 1321   BP: 108/60   BP Location: Left arm   Patient Position: Sitting   Cuff Size: Adult   Pulse: 103   Temp: 98 °F (36.7 °C)   TempSrc: Temporal   SpO2: 99%   Weight: 55.3 kg (122 lb)   Height: 154.9 cm (61\")   PainSc:   5   PainLoc: Abdomen     Body mass index is 23.05 kg/m².     Physical Exam  Vitals and nursing note reviewed.   Constitutional:       Appearance: Normal appearance. She is normal weight. She is not ill-appearing or diaphoretic.      Comments: BMI 23.05. Pleasantly conversant.    HENT:      Head: Normocephalic and atraumatic.      Right Ear: External ear normal.      Left Ear: External ear normal.      Nose: Nose normal.      Mouth/Throat:      Mouth: Mucous membranes are moist.      Pharynx: Oropharynx is clear.   Eyes:      Conjunctiva/sclera: Conjunctivae normal.      Pupils: Pupils are equal, round, and reactive to light.   Neck:      Thyroid: No thyromegaly.   Cardiovascular:      Rate and Rhythm: Normal rate and regular rhythm.      Pulses: Normal pulses.      Heart sounds: Normal heart sounds.   Pulmonary:      Effort: Pulmonary effort is normal.      Breath sounds: Normal breath sounds.   Abdominal:      General: Abdomen is flat. Bowel sounds are normal. There is no distension.      Tenderness: There is abdominal tenderness (epigastric, bilateral lower quadrants).   Musculoskeletal:         General: Normal range of motion.      Cervical back: Normal range of motion and neck supple.   Skin:     General: Skin is warm and dry. "   Neurological:      General: No focal deficit present.      Mental Status: She is oriented to person, place, and time.   Psychiatric:         Mood and Affect: Mood normal.         Assessment / Plan      Assessment/Plan:   There are no diagnoses linked to this encounter.     GERD  H/o H Pylori infection  H/o PUD  Nausea  Lower abdominal pain  IBS   - continue esomeprazole 40mg, recommend starting daily use   - rx for carafate 1g QID x 14 days    - pt given GERD diet instructions, advised to avoid GI irritants such as caffeine, carbonation, EtOH, tobacco, chocolate, peppermint, acid-based foods   - obtain CBC, CMP, CRP, celiac panel, Mg, TSH, vit D, stool panel, C difficile, ova + parasites, fecal calprotectin    - schedule for EGD / CSY   - follow up in clinic after completion of above studies   - call clinic at any time for questions or new / worsened sx      Follow Up:   Return for Next scheduled follow up.    Plan of care reviewed with the patient at the conclusion of today's visit.  Education was provided regarding diagnosis, management, and any prescribed or recommended OTC medications.  Patient verbalized understanding of and agreement with management plan.     NOTE TO PATIENT: The 21st Century Cures Act makes medical notes like these available to patients in the interest of transparency. However, be advised this is a medical document. It is intended as peer to peer communication. It is written in medical language and may contain abbreviations or verbiage that are unfamiliar. It may appear blunt or direct. Medical documents are intended to carry relevant information, facts as evident, and the clinical opinion of the practitioner.     Time Statement:   Discussed plan of care in detail with patient today. Patient verbally understands and agrees. I have spent 45 minutes reviewing available diagnostics, obtaining history, examining the patient, developing a treatment plan, and educating the patient on disease  process and plan of care.    Maricel Medina PA-C   [facility]

## 2024-11-19 DIAGNOSIS — E55.9 VITAMIN D DEFICIENCY: Primary | ICD-10-CM

## 2024-11-19 LAB
25(OH)D3 SERPL-MCNC: 20.6 NG/ML (ref 30–100)
ENDOMYSIUM IGA SER QL: NEGATIVE
GLIADIN PEPTIDE IGA SER-ACNC: 4 UNITS (ref 0–19)
GLIADIN PEPTIDE IGG SER-ACNC: 2 UNITS (ref 0–19)
IGA SERPL-MCNC: 147 MG/DL (ref 87–352)
TTG IGA SER-ACNC: <2 U/ML (ref 0–3)
TTG IGG SER-ACNC: 2 U/ML (ref 0–5)

## 2024-11-19 RX ORDER — ERGOCALCIFEROL 1.25 MG/1
50000 CAPSULE, LIQUID FILLED ORAL WEEKLY
Qty: 12 CAPSULE | Refills: 0 | Status: SHIPPED | OUTPATIENT
Start: 2024-11-19

## 2024-11-19 NOTE — PROGRESS NOTES
Please let Denaehannahesdras know that her labs reveal low vitamin D, and I sent a prescription for ergocalciferol to pharmacy for her. Her CBC has some variability that is essentially within normal limits. CMP, CRP, Mg, TSH within normal limits. Celiac panel pending.

## 2024-11-20 ENCOUNTER — LAB (OUTPATIENT)
Dept: LAB | Facility: HOSPITAL | Age: 34
End: 2024-11-20

## 2024-11-20 DIAGNOSIS — R10.30 LOWER ABDOMINAL PAIN: ICD-10-CM

## 2024-11-20 DIAGNOSIS — K58.9 IRRITABLE BOWEL SYNDROME, UNSPECIFIED TYPE: ICD-10-CM

## 2024-11-20 LAB
ADV 40+41 DNA STL QL NAA+NON-PROBE: NOT DETECTED
ASTRO TYP 1-8 RNA STL QL NAA+NON-PROBE: NOT DETECTED
C CAYETANENSIS DNA STL QL NAA+NON-PROBE: NOT DETECTED
C COLI+JEJ+UPSA DNA STL QL NAA+NON-PROBE: NOT DETECTED
C DIFF TOX GENS STL QL NAA+PROBE: NOT DETECTED
CRYPTOSP DNA STL QL NAA+NON-PROBE: NOT DETECTED
E HISTOLYT DNA STL QL NAA+NON-PROBE: NOT DETECTED
EAEC PAA PLAS AGGR+AATA ST NAA+NON-PRB: NOT DETECTED
EC STX1+STX2 GENES STL QL NAA+NON-PROBE: NOT DETECTED
EPEC EAE GENE STL QL NAA+NON-PROBE: NOT DETECTED
ETEC LTA+ST1A+ST1B TOX ST NAA+NON-PROBE: NOT DETECTED
G LAMBLIA DNA STL QL NAA+NON-PROBE: NOT DETECTED
NOROVIRUS GI+II RNA STL QL NAA+NON-PROBE: NOT DETECTED
P SHIGELLOIDES DNA STL QL NAA+NON-PROBE: NOT DETECTED
RVA RNA STL QL NAA+NON-PROBE: NOT DETECTED
S ENT+BONG DNA STL QL NAA+NON-PROBE: NOT DETECTED
SAPO I+II+IV+V RNA STL QL NAA+NON-PROBE: NOT DETECTED
SHIGELLA SP+EIEC IPAH ST NAA+NON-PROBE: NOT DETECTED
V CHOL+PARA+VUL DNA STL QL NAA+NON-PROBE: NOT DETECTED
V CHOLERAE DNA STL QL NAA+NON-PROBE: NOT DETECTED
Y ENTEROCOL DNA STL QL NAA+NON-PROBE: NOT DETECTED

## 2024-11-20 PROCEDURE — 83993 ASSAY FOR CALPROTECTIN FECAL: CPT

## 2024-11-20 PROCEDURE — 87177 OVA AND PARASITES SMEARS: CPT | Performed by: PHYSICIAN ASSISTANT

## 2024-11-20 PROCEDURE — 87493 C DIFF AMPLIFIED PROBE: CPT

## 2024-11-20 PROCEDURE — 87507 IADNA-DNA/RNA PROBE TQ 12-25: CPT

## 2024-11-20 PROCEDURE — 87209 SMEAR COMPLEX STAIN: CPT | Performed by: PHYSICIAN ASSISTANT

## 2024-11-21 NOTE — PROGRESS NOTES
Please let Reyes know that her stool panel and C difficile were negative. Fecal calprotectin pending.  1 person assist

## 2024-11-22 LAB — CALPROTECTIN STL-MCNT: 6 UG/G (ref 0–120)

## 2024-11-23 LAB
O+P SPEC MICRO: NORMAL
O+P STL TRI STN: NORMAL

## 2024-11-27 ENCOUNTER — TELEPHONE (OUTPATIENT)
Dept: MRI IMAGING | Facility: HOSPITAL | Age: 34
End: 2024-11-27

## 2024-11-27 NOTE — TELEPHONE ENCOUNTER
Patient was recommended from her MRI Breast for a Left 2nd look ultrasound. Re-scheduled for 12/3/2024 at 8:00 with 7:45 arrival at [facility] [place]. No BT. Encouraged to call with any further questions.

## 2024-12-03 ENCOUNTER — HOSPITAL ENCOUNTER (OUTPATIENT)
Dept: ULTRASOUND IMAGING | Facility: HOSPITAL | Age: 34
Discharge: HOME | End: 2024-12-03

## 2024-12-03 ENCOUNTER — HOSPITAL ENCOUNTER (OUTPATIENT)
Dept: MAMMOGRAPHY | Facility: HOSPITAL | Age: 34
Discharge: HOME | End: 2024-12-03

## 2024-12-03 DIAGNOSIS — R92.8 ABNORMAL FINDINGS ON DIAGNOSTIC IMAGING OF BREAST: ICD-10-CM

## 2024-12-03 PROCEDURE — A4648 IMPLANTABLE TISSUE MARKER: HCPCS

## 2024-12-03 PROCEDURE — 25010000002 LIDOCAINE 1 % SOLUTION: Performed by: RADIOLOGY

## 2024-12-03 PROCEDURE — 88305 TISSUE EXAM BY PATHOLOGIST: CPT | Performed by: RADIOLOGY

## 2024-12-03 PROCEDURE — 25010000002 LIDOCAINE 1% - EPINEPHRINE 1:100000 1 %-1:100000 SOLUTION: Performed by: RADIOLOGY

## 2024-12-03 RX ORDER — LIDOCAINE HYDROCHLORIDE AND EPINEPHRINE 10; 10 MG/ML; UG/ML
10 INJECTION, SOLUTION INFILTRATION; PERINEURAL ONCE
Status: COMPLETED | OUTPATIENT
Start: 2024-12-03 | End: 2024-12-03

## 2024-12-03 RX ORDER — LIDOCAINE HYDROCHLORIDE 10 MG/ML
5 INJECTION, SOLUTION INFILTRATION; PERINEURAL ONCE
Status: COMPLETED | OUTPATIENT
Start: 2024-12-03 | End: 2024-12-03

## 2024-12-03 RX ADMIN — Medication 3 ML: at 09:16

## 2024-12-03 RX ADMIN — LIDOCAINE HYDROCHLORIDE,EPINEPHRINE BITARTRATE 2 ML: 10; .01 INJECTION, SOLUTION INFILTRATION; PERINEURAL at 09:16

## 2024-12-04 ENCOUNTER — TELEPHONE (OUTPATIENT)
Dept: MAMMOGRAPHY | Facility: HOSPITAL | Age: 34
End: 2024-12-04

## 2024-12-04 LAB
CYTO UR: NORMAL
LAB AP CASE REPORT: NORMAL
LAB AP CLINICAL INFORMATION: NORMAL
LAB AP DIAGNOSIS COMMENT: NORMAL
PATH REPORT.FINAL DX SPEC: NORMAL
PATH REPORT.GROSS SPEC: NORMAL

## 2024-12-04 NOTE — TELEPHONE ENCOUNTER
Attempted to notify patient of surgical consult appointment.  A message was left on her voicemail to return my call.

## 2024-12-04 NOTE — TELEPHONE ENCOUNTER
Patient notified of surgical consult appointment with Dr MAXI Pond on 1/16/25 @ 1015 with arrival time of 0945 [address]. Patient given office contact & location information. Patient aware they will receive an information packet from [facility] with detailed location instructions. Patient notified to bring photo ID and list of prescription & OTC medications. She was also made aware [facility] requires her to bring between $188-200 on the day of appointment. She was also encouraged to call their office with any financial questions. Patient verbalized understanding.

## 2024-12-04 NOTE — TELEPHONE ENCOUNTER
Patient notified of pathology results and recommendations. Verbalizes understanding. Denies discomfort. Denies signs and symptoms of infection.     Patient desires first available for surgical consult. Patient will be notified of appointment. Patient verbalized understanding.

## 2024-12-09 RX ORDER — SODIUM, POTASSIUM,MAG SULFATES 17.5-3.13G
1 SOLUTION, RECONSTITUTED, ORAL ORAL TAKE AS DIRECTED
Qty: 354 ML | Refills: 0 | Status: SHIPPED | OUTPATIENT
Start: 2024-12-09

## 2024-12-18 ENCOUNTER — OUTSIDE FACILITY SERVICE (OUTPATIENT)
Dept: GASTROENTEROLOGY | Facility: CLINIC | Age: 34
End: 2024-12-18

## 2024-12-18 PROCEDURE — 88305 TISSUE EXAM BY PATHOLOGIST: CPT | Performed by: INTERNAL MEDICINE

## 2024-12-18 PROCEDURE — 45380 COLONOSCOPY AND BIOPSY: CPT | Performed by: INTERNAL MEDICINE

## 2024-12-18 PROCEDURE — 43239 EGD BIOPSY SINGLE/MULTIPLE: CPT | Performed by: INTERNAL MEDICINE

## 2024-12-19 ENCOUNTER — LAB REQUISITION (OUTPATIENT)
Dept: LAB | Facility: HOSPITAL | Age: 34
End: 2024-12-19

## 2024-12-19 DIAGNOSIS — K64.8 OTHER HEMORRHOIDS: ICD-10-CM

## 2024-12-19 DIAGNOSIS — R10.30 LOWER ABDOMINAL PAIN, UNSPECIFIED: ICD-10-CM

## 2024-12-19 DIAGNOSIS — K21.00 GASTRO-ESOPHAGEAL REFLUX DISEASE WITH ESOPHAGITIS, WITHOUT BLEEDING: ICD-10-CM

## 2024-12-19 DIAGNOSIS — R10.13 EPIGASTRIC PAIN: ICD-10-CM

## 2024-12-19 DIAGNOSIS — Z87.11 PERSONAL HISTORY OF PEPTIC ULCER DISEASE: ICD-10-CM

## 2024-12-20 LAB — REF LAB TEST METHOD: NORMAL

## 2024-12-23 ENCOUNTER — TELEPHONE (OUTPATIENT)
Dept: GASTROENTEROLOGY | Facility: CLINIC | Age: 34
End: 2024-12-23

## 2024-12-23 DIAGNOSIS — K29.70 HELICOBACTER PYLORI GASTRITIS: ICD-10-CM

## 2024-12-23 DIAGNOSIS — K29.70 HELICOBACTER PYLORI GASTRITIS: Primary | ICD-10-CM

## 2024-12-23 DIAGNOSIS — B96.81 HELICOBACTER PYLORI GASTRITIS: ICD-10-CM

## 2024-12-23 DIAGNOSIS — B96.81 HELICOBACTER PYLORI GASTRITIS: Primary | ICD-10-CM

## 2024-12-23 RX ORDER — CLARITHROMYCIN 500 MG/1
500 TABLET ORAL 2 TIMES DAILY
Qty: 28 TABLET | Refills: 0 | Status: SHIPPED | OUTPATIENT
Start: 2024-12-23

## 2024-12-23 RX ORDER — AMOXICILLIN 500 MG/1
1000 CAPSULE ORAL 2 TIMES DAILY
Qty: 56 CAPSULE | Refills: 0 | Status: SHIPPED | OUTPATIENT
Start: 2024-12-23

## 2024-12-23 RX ORDER — PANTOPRAZOLE SODIUM 40 MG/1
40 TABLET, DELAYED RELEASE ORAL 2 TIMES DAILY
Qty: 28 TABLET | Refills: 0 | Status: SHIPPED | OUTPATIENT
Start: 2024-12-23 | End: 2025-01-06

## 2024-12-23 RX ORDER — CLARITHROMYCIN 500 MG/1
500 TABLET ORAL 2 TIMES DAILY
Qty: 28 TABLET | Refills: 0 | Status: SHIPPED | OUTPATIENT
Start: 2024-12-23 | End: 2024-12-23 | Stop reason: SDUPTHER

## 2024-12-23 RX ORDER — PANTOPRAZOLE SODIUM 40 MG/1
40 TABLET, DELAYED RELEASE ORAL 2 TIMES DAILY
Qty: 28 TABLET | Refills: 0 | Status: SHIPPED | OUTPATIENT
Start: 2024-12-23 | End: 2024-12-23 | Stop reason: SDUPTHER

## 2024-12-23 RX ORDER — AMOXICILLIN 500 MG/1
1000 CAPSULE ORAL 2 TIMES DAILY
Qty: 56 CAPSULE | Refills: 0 | Status: SHIPPED | OUTPATIENT
Start: 2024-12-23 | End: 2024-12-23 | Stop reason: SDUPTHER

## 2024-12-23 NOTE — TELEPHONE ENCOUNTER
----- Message from Maricel Medina sent at 12/23/2024  8:49 AM EST -----  Per Dr. Dial:  There was H. Pylori present on the gastric biopsies. The colon biopsies were all normal.    Rx for amoxil, biaxin and pantoprazole have been sent to pharmacy for her.

## 2024-12-23 NOTE — PROGRESS NOTES
Per Dr. Dial:  There was H. Pylori present on the gastric biopsies. The colon biopsies were all normal.    Rx for amoxil, biaxin and pantoprazole have been sent to pharmacy for her.

## 2025-01-10 ENCOUNTER — TELEPHONE (OUTPATIENT)
Dept: MAMMOGRAPHY | Facility: HOSPITAL | Age: 35
End: 2025-01-10

## 2025-01-10 NOTE — TELEPHONE ENCOUNTER
Returned patient call, questions answered about surgical consult appointment.  Provided number to [facility], verbalized understanding.

## 2025-01-15 ENCOUNTER — OFFICE VISIT (OUTPATIENT)
Dept: GASTROENTEROLOGY | Facility: CLINIC | Age: 35
End: 2025-01-15

## 2025-01-15 VITALS
OXYGEN SATURATION: 99 % | DIASTOLIC BLOOD PRESSURE: 64 MMHG | HEIGHT: 61 IN | WEIGHT: 120 LBS | TEMPERATURE: 98.3 F | HEART RATE: 82 BPM | SYSTOLIC BLOOD PRESSURE: 102 MMHG | BODY MASS INDEX: 22.66 KG/M2

## 2025-01-15 DIAGNOSIS — K29.70 HELICOBACTER PYLORI GASTRITIS: Primary | ICD-10-CM

## 2025-01-15 DIAGNOSIS — B96.81 HELICOBACTER PYLORI GASTRITIS: Primary | ICD-10-CM

## 2025-01-15 DIAGNOSIS — E55.9 VITAMIN D DEFICIENCY: ICD-10-CM

## 2025-01-15 DIAGNOSIS — K58.9 IRRITABLE BOWEL SYNDROME, UNSPECIFIED TYPE: ICD-10-CM

## 2025-01-15 NOTE — PROGRESS NOTES
Follow Up      Patient Name: Reyes Peguero  : 1990   MRN: [id number]     Chief Complaint:    Chief Complaint   Patient presents with    2 week f/u     Colonoscopy & EGD       History of Present Illness: Reyes Peguero is a 34 y.o. female who is here today for post procedure follow up.     Pt is taking clarithromycin, amoxil, esomeprazole as prescribed for treatment of H Pylori gastritis. She has five days left of treatment. Overall, she notes improvement of sx. She continues to have mild nausea, generalized abdominal cramping and about 2 loose stools per day. She reports great improvement of sx with use of FDgard and is requesting more samples of such.     Patient denies associated fever, chills, vomiting, constipation, hematemesis, dysphagia, hematochezia, melena, bloating, weight loss or gain, dysuria, jaundice or bruising.    Labs since last visit reveal normal stool panel, ova + parasites, fecal calprotectin. Vit D 20.6. CBC, CMP, CRP, Mg, TSH essentially within normal limits.     EGD / CSY 2024 with Dr. Dial. Normal upper / middle third of esophagus. LA Grade A esophagitis. Normal stomach and duodenum. Bx consistent with H Pylori gastritis without intestinal metaplasia or dysplasia, negative for EoE, celiac disease. Good bowel prep conditions. Nonbleeding internal hemorrhoids. Normal appearing colon mucosa. Random colon bx negative for acute or chronic colitis.     Subjective      Review of Systems:   Review of Systems   Constitutional:  Negative for appetite change, chills, diaphoresis, fatigue, fever, unexpected weight gain and unexpected weight loss.   HENT:  Negative for drooling, facial swelling, mouth sores, rhinorrhea, sore throat, tinnitus, trouble swallowing and voice change.    Eyes: Negative.    Respiratory:  Negative for cough, chest tightness and shortness of breath.    Cardiovascular:  Negative for chest pain.   Gastrointestinal:  Positive  for abdominal pain (generalized cramping), diarrhea (loose stools) and nausea. Negative for blood in stool, constipation, vomiting, GERD and indigestion.   Genitourinary:  Negative for dysuria, flank pain, hematuria and pelvic pain.   Musculoskeletal:  Negative for arthralgias and myalgias.   Skin:  Negative for color change, pallor and rash.   Neurological:  Negative for dizziness, tremors, syncope, weakness and numbness.   Psychiatric/Behavioral:  Negative for hallucinations and sleep disturbance. The patient is not nervous/anxious.    All other systems reviewed and are negative.      Medications:     Current Outpatient Medications:     amoxicillin (AMOXIL) 500 MG capsule, Take 2 capsules by mouth 2 (Two) Times a Day., Disp: 56 capsule, Rfl: 0    calcium carbonate (TUMS) 500 MG chewable tablet, Chew 1 tablet Daily As Needed for Indigestion or Heartburn., Disp: , Rfl:     clarithromycin (BIAXIN) 500 MG tablet, Take 1 tablet by mouth 2 (Two) Times a Day., Disp: 28 tablet, Rfl: 0    fexofenadine (Allegra Allergy) 180 MG tablet, Take 1 tablet by mouth Daily., Disp: 90 tablet, Rfl: 3    sucralfate (Carafate) 1 g tablet, Take 1 tablet by mouth 4 (Four) Times a Day., Disp: 120 tablet, Rfl: 1    vitamin D (ERGOCALCIFEROL) 1.25 MG (95964 UT) capsule capsule, Take 1 capsule by mouth 1 (One) Time Per Week., Disp: 12 capsule, Rfl: 0    Allergies:   No Known Allergies    Social History:   Social History     Socioeconomic History    Marital status: Single   Tobacco Use    Smoking status: Never    Smokeless tobacco: Never   Vaping Use    Vaping status: Never Used   Substance and Sexual Activity    Alcohol use: Never    Drug use: Never        Surgical History:   Past Surgical History:   Procedure Laterality Date    TONSILLECTOMY          Medical History:   Past Medical History:   Diagnosis Date    GERD (gastroesophageal reflux disease)     Irritable bowel syndrome     Peptic ulceration         Objective     Physical Exam:  Vital  "Signs:   Vitals:    01/15/25 0810   BP: 102/64   BP Location: Left arm   Patient Position: Sitting   Cuff Size: Adult   Pulse: 82   Temp: 98.3 °F (36.8 °C)   TempSrc: Temporal   SpO2: 99%   Weight: 54.4 kg (120 lb)   Height: 154.9 cm (61\")   PainSc:   6   PainLoc: Abdomen     Body mass index is 22.67 kg/m².     Physical Exam  Vitals and nursing note reviewed.   Constitutional:       Appearance: Normal appearance. She is normal weight. She is not ill-appearing or diaphoretic.   HENT:      Head: Normocephalic and atraumatic.      Right Ear: External ear normal.      Left Ear: External ear normal.      Nose: Nose normal.      Mouth/Throat:      Mouth: Mucous membranes are moist.      Pharynx: Oropharynx is clear.   Eyes:      Conjunctiva/sclera: Conjunctivae normal.      Pupils: Pupils are equal, round, and reactive to light.   Neck:      Thyroid: No thyromegaly.   Cardiovascular:      Rate and Rhythm: Normal rate and regular rhythm.      Pulses: Normal pulses.      Heart sounds: Normal heart sounds.   Pulmonary:      Effort: Pulmonary effort is normal.      Breath sounds: Normal breath sounds.   Abdominal:      General: Abdomen is flat. Bowel sounds are normal. There is no distension.      Tenderness: There is no abdominal tenderness.   Musculoskeletal:         General: Normal range of motion.      Cervical back: Normal range of motion and neck supple.   Skin:     General: Skin is warm and dry.   Neurological:      General: No focal deficit present.      Mental Status: She is oriented to person, place, and time.   Psychiatric:         Mood and Affect: Mood normal.         Assessment / Plan      Assessment/Plan:   There are no diagnoses linked to this encounter.     H Pylori gastritis  IBS-D   - continue all medications as prescribed   - obtain H Pylori stool test after holding PPI x 2 weeks   - pt afforded additional samples of FDgard in office today    - would consider trial of Xifaxan pending eradication of HP, but " may be limited due to pt's self pay status   - previous office notes, hospital records, labs, imaging, endoscopy and pathology reports reviewed with patient    - pt given GERD, low FODMAP diet instructions, advised to avoid GI irritants such as caffeine, carbonation, EtOH, tobacco, chocolate, peppermint, acid-based foods   - follow up in clinic after completion of above studies   - call clinic at any time for questions or new / worsened sx      Follow Up:   Return for Next scheduled follow up.    Plan of care reviewed with the patient at the conclusion of today's visit.  Education was provided regarding diagnosis, management, and any prescribed or recommended OTC medications.  Patient verbalized understanding of and agreement with management plan.     NOTE TO PATIENT: The 21st Century Cures Act makes medical notes like these available to patients in the interest of transparency. However, be advised this is a medical document. It is intended as peer to peer communication. It is written in medical language and may contain abbreviations or verbiage that are unfamiliar. It may appear blunt or direct. Medical documents are intended to carry relevant information, facts as evident, and the clinical opinion of the practitioner.     Time Statement:   Discussed plan of care in detail with patient today. Patient verbally understands and agrees. I have spent 30 minutes reviewing available diagnostics, obtaining history, examining the patient, developing a treatment plan, and educating the patient on disease process and plan of care.     Maricel Medina PA-C   [facility]

## 2025-01-21 ENCOUNTER — TRANSCRIBE ORDERS (OUTPATIENT)
Dept: ADMINISTRATIVE | Facility: HOSPITAL | Age: 35
End: 2025-01-21

## 2025-01-21 DIAGNOSIS — R92.8 ABNORMAL MAMMOGRAM: Primary | ICD-10-CM

## 2025-01-23 ENCOUNTER — OFFICE VISIT (OUTPATIENT)
Dept: FAMILY MEDICINE CLINIC | Facility: CLINIC | Age: 35
End: 2025-01-23

## 2025-01-23 VITALS
HEART RATE: 83 BPM | BODY MASS INDEX: 23.07 KG/M2 | OXYGEN SATURATION: 99 % | WEIGHT: 122.2 LBS | DIASTOLIC BLOOD PRESSURE: 74 MMHG | SYSTOLIC BLOOD PRESSURE: 110 MMHG | HEIGHT: 61 IN

## 2025-01-23 DIAGNOSIS — Z13.29 SCREENING FOR ENDOCRINE DISORDER: ICD-10-CM

## 2025-01-23 DIAGNOSIS — N93.9 ABNORMAL UTERINE BLEEDING: Primary | ICD-10-CM

## 2025-01-23 DIAGNOSIS — Z13.0 SCREENING FOR DEFICIENCY ANEMIA: ICD-10-CM

## 2025-01-23 RX ORDER — NAPROXEN 500 MG/1
500 TABLET ORAL 2 TIMES DAILY PRN
Qty: 60 TABLET | Refills: 0 | Status: SHIPPED | OUTPATIENT
Start: 2025-01-23

## 2025-01-23 NOTE — PROGRESS NOTES
Addended by: SHREYAS OSBORNE on: 5/14/2021 08:54 AM     Modules accepted: Orders     Established Patient Office Visit      Patient Name: Reyes Peguero  : 1990   MRN: [id number]   Care Team: Patient Care Team:  Skip Burroughs DO as PCP - General (Family Medicine)    Chief Complaint:    Chief Complaint   Patient presents with    Vaginal Bleeding     After menstrual cycle. Last cycle was - stopped for 4 days and began spotting for 5 days.  started period came 3 days late. Periods usually last for ten days.       History of Present Illness: Reyes Peguero is a 34 y.o. female who is here today for chief complaint.    HPI    Last menses -, off for 4 days, then spotting  Started again , 4 days early  Last month had spotting around ovulation time as well.  Had breast biopsy in November for breast mass  Usually last 14 days, now lasting 7-10 days  Had missed period about 5 years ago, saw GyN had US which showed right ovarian cyst, elevated prolactin    This patient is accompanied by their self who contributes to the history of their care.    The following portions of the patient's history were reviewed and updated as appropriate: allergies, current medications, past family history, past medical history, past social history, past surgical history and problem list.    Subjective      Review of Systems:   Review of Systems - See HPI    Past Medical History:   Past Medical History:   Diagnosis Date    GERD (gastroesophageal reflux disease)     Irritable bowel syndrome     Peptic ulceration        Past Surgical History:   Past Surgical History:   Procedure Laterality Date    TONSILLECTOMY         Family History:   Family History   Problem Relation Age of Onset    Heart disease Mother     Hyperlipidemia Mother     Diabetes Father     Hyperlipidemia Father     Breast cancer Paternal Aunt     Colon cancer Neg Hx     Esophageal cancer Neg Hx        Social History:   Social History     Socioeconomic History    Marital status: Single   Tobacco  "Use    Smoking status: Never    Smokeless tobacco: Never   Vaping Use    Vaping status: Never Used   Substance and Sexual Activity    Alcohol use: Never    Drug use: Never       Tobacco History:   Social History     Tobacco Use   Smoking Status Never   Smokeless Tobacco Never       Medications:   Outpatient Medications Prior to Visit   Medication Sig Dispense Refill    calcium carbonate (TUMS) 500 MG chewable tablet Chew 1 tablet Daily As Needed for Indigestion or Heartburn.      amoxicillin (AMOXIL) 500 MG capsule Take 2 capsules by mouth 2 (Two) Times a Day. (Patient not taking: Reported on 1/23/2025) 56 capsule 0    clarithromycin (BIAXIN) 500 MG tablet Take 1 tablet by mouth 2 (Two) Times a Day. (Patient not taking: Reported on 1/23/2025) 28 tablet 0    fexofenadine (Allegra Allergy) 180 MG tablet Take 1 tablet by mouth Daily. (Patient not taking: Reported on 1/23/2025) 90 tablet 3    sucralfate (Carafate) 1 g tablet Take 1 tablet by mouth 4 (Four) Times a Day. (Patient not taking: Reported on 1/23/2025) 120 tablet 1    vitamin D (ERGOCALCIFEROL) 1.25 MG (64086 UT) capsule capsule Take 1 capsule by mouth 1 (One) Time Per Week. (Patient not taking: Reported on 1/23/2025) 12 capsule 0     No facility-administered medications prior to visit.        Allergies:   No Known Allergies    Objective   Objective     Physical Exam:  Vital Signs:   Vitals:    01/23/25 1246   BP: 110/74   Pulse: 83   SpO2: 99%   Weight: 55.4 kg (122 lb 3.2 oz)   Height: 154.9 cm (60.98\")     Body mass index is 23.1 kg/m².     Physical Exam  Nursing note reviewed  Const: NAD, A&Ox4, Pleasant, Cooperative  Eyes: EOMI, no conjunctivitis  ENT: No nasal discharge present, neck supple  Cardiac: Regular rate and rhythm, no cyanosis  Resp: Respiratory rate within normal limits, no increased work of breathing, no audible wheezing or retractions noted  GI: No distention or ascites  MSK: Motor and sensation grossly intact in bilateral upper " extremities  Neurologic: CN II-XII grossly intact  Psych: Appropriate mood and behavior.  Skin: Warm, dry  Procedures/Radiology     Procedures  No radiology results for the last 7 days     Assessment & Plan   Assessment / Plan      Assessment/Plan:   Problems Addressed This Visit  Diagnoses and all orders for this visit:    1. Abnormal uterine bleeding (Primary)  -     Comprehensive Metabolic Panel; Future  -     CBC & Differential; Future  -     TSH Rfx On Abnormal To Free T4; Future  -     Follicle stimulating hormone; Future  -     Luteinizing hormone; Future  -     Prolactin; Future  -     naproxen (NAPROSYN) 500 MG tablet; Take 1 tablet by mouth 2 (Two) Times a Day As Needed for Moderate Pain. Take with food  Dispense: 60 tablet; Refill: 0    2. Screening for deficiency anemia  -     CBC & Differential; Future    3. Screening for endocrine disorder  -     TSH Rfx On Abnormal To Free T4; Future      Problem List Items Addressed This Visit    None  Visit Diagnoses       Abnormal uterine bleeding    -  Primary    Relevant Medications    naproxen (NAPROSYN) 500 MG tablet    Other Relevant Orders    Comprehensive Metabolic Panel (Completed)    CBC & Differential (Completed)    TSH Rfx On Abnormal To Free T4 (Completed)    Follicle stimulating hormone (Completed)    Luteinizing hormone (Completed)    Prolactin (Completed)    Screening for deficiency anemia        Relevant Orders    CBC & Differential (Completed)    Screening for endocrine disorder        Relevant Orders    TSH Rfx On Abnormal To Free T4 (Completed)            There are no Patient Instructions on file for this visit.    Follow Up:   No follow-ups on file.    DO RUDY Hawkins RD  [facility]  [address]  [place], located within [facility] [address]  Fax [phone]  Phone [phone]    Disclaimer to patients: The 21st Century Cares Act makes medical notes like these available to patients in  the interest of transparency. However, please be advised that this is still a medical document. It is intended as [id number] communication. Many sections may include medical language or jargon, abbreviations, and additional verbiage that are unfamiliar or confusing. In some ways it may come across as blunt, direct, or may be summarized in order to clearly and concisely communicate the most crucial information to medical professionals. It may also include mentions of conditions that are unlikely but considered as part of the differential diagnosis, including serious disorders. These are not always discussed at length at the time of appointment because their likelihood is so low, but may be included in a medical note to make it clear what has been considered and/or ruled out as part of a work-up. Medical documents are intended to carry relevant information, facts as evident, and the personal clinical opinion of the physician. If you have any questions regarding this medical document, please bring them to the attention of the physician at your next scheduled appointment.

## 2025-01-30 ENCOUNTER — LAB (OUTPATIENT)
Dept: LAB | Facility: HOSPITAL | Age: 35
End: 2025-01-30

## 2025-01-30 PROCEDURE — 87338 HPYLORI STOOL AG IA: CPT | Performed by: PHYSICIAN ASSISTANT

## 2025-01-31 ENCOUNTER — LAB (OUTPATIENT)
Dept: LAB | Facility: HOSPITAL | Age: 35
End: 2025-01-31

## 2025-01-31 DIAGNOSIS — Z13.0 SCREENING FOR DEFICIENCY ANEMIA: ICD-10-CM

## 2025-01-31 DIAGNOSIS — N93.9 ABNORMAL UTERINE BLEEDING: ICD-10-CM

## 2025-01-31 DIAGNOSIS — Z13.29 SCREENING FOR ENDOCRINE DISORDER: ICD-10-CM

## 2025-01-31 LAB
ALBUMIN SERPL-MCNC: 4.1 G/DL (ref 3.5–5.2)
ALBUMIN/GLOB SERPL: 1.1 G/DL
ALP SERPL-CCNC: 71 U/L (ref 39–117)
ALT SERPL W P-5'-P-CCNC: 7 U/L (ref 1–33)
ANION GAP SERPL CALCULATED.3IONS-SCNC: 15.6 MMOL/L (ref 5–15)
AST SERPL-CCNC: 17 U/L (ref 1–32)
BASOPHILS # BLD AUTO: 0.02 10*3/MM3 (ref 0–0.2)
BASOPHILS NFR BLD AUTO: 0.7 % (ref 0–1.5)
BILIRUB SERPL-MCNC: 0.7 MG/DL (ref 0–1.2)
BUN SERPL-MCNC: 7 MG/DL (ref 6–20)
BUN/CREAT SERPL: 9.9 (ref 7–25)
CALCIUM SPEC-SCNC: 9.1 MG/DL (ref 8.6–10.5)
CHLORIDE SERPL-SCNC: 99 MMOL/L (ref 98–107)
CO2 SERPL-SCNC: 22.4 MMOL/L (ref 22–29)
CREAT SERPL-MCNC: 0.71 MG/DL (ref 0.57–1)
DEPRECATED RDW RBC AUTO: 38.5 FL (ref 37–54)
EGFRCR SERPLBLD CKD-EPI 2021: 115.3 ML/MIN/1.73
EOSINOPHIL # BLD AUTO: 0.06 10*3/MM3 (ref 0–0.4)
EOSINOPHIL NFR BLD AUTO: 2.1 % (ref 0.3–6.2)
ERYTHROCYTE [DISTWIDTH] IN BLOOD BY AUTOMATED COUNT: 12.3 % (ref 12.3–15.4)
FSH SERPL-ACNC: 4.24 MIU/ML
GLOBULIN UR ELPH-MCNC: 3.6 GM/DL
GLUCOSE SERPL-MCNC: 84 MG/DL (ref 65–99)
HCT VFR BLD AUTO: 37.2 % (ref 34–46.6)
HGB BLD-MCNC: 12.3 G/DL (ref 12–15.9)
IMM GRANULOCYTES # BLD AUTO: 0.01 10*3/MM3 (ref 0–0.05)
IMM GRANULOCYTES NFR BLD AUTO: 0.4 % (ref 0–0.5)
LH SERPL-ACNC: 9.68 MIU/ML
LYMPHOCYTES # BLD AUTO: 1.01 10*3/MM3 (ref 0.7–3.1)
LYMPHOCYTES NFR BLD AUTO: 36.1 % (ref 19.6–45.3)
MCH RBC QN AUTO: 28.5 PG (ref 26.6–33)
MCHC RBC AUTO-ENTMCNC: 33.1 G/DL (ref 31.5–35.7)
MCV RBC AUTO: 86.3 FL (ref 79–97)
MONOCYTES # BLD AUTO: 0.43 10*3/MM3 (ref 0.1–0.9)
MONOCYTES NFR BLD AUTO: 15.4 % (ref 5–12)
NEUTROPHILS NFR BLD AUTO: 1.27 10*3/MM3 (ref 1.7–7)
NEUTROPHILS NFR BLD AUTO: 45.3 % (ref 42.7–76)
NRBC BLD AUTO-RTO: 0 /100 WBC (ref 0–0.2)
PLATELET # BLD AUTO: 256 10*3/MM3 (ref 140–450)
PMV BLD AUTO: 11.2 FL (ref 6–12)
POTASSIUM SERPL-SCNC: 3.9 MMOL/L (ref 3.5–5.2)
PROLACTIN SERPL-MCNC: 7.26 NG/ML (ref 4.79–23.3)
PROT SERPL-MCNC: 7.7 G/DL (ref 6–8.5)
RBC # BLD AUTO: 4.31 10*6/MM3 (ref 3.77–5.28)
SODIUM SERPL-SCNC: 137 MMOL/L (ref 136–145)
TSH SERPL DL<=0.05 MIU/L-ACNC: 1.8 UIU/ML (ref 0.27–4.2)
WBC NRBC COR # BLD AUTO: 2.8 10*3/MM3 (ref 3.4–10.8)

## 2025-01-31 PROCEDURE — 83002 ASSAY OF GONADOTROPIN (LH): CPT

## 2025-01-31 PROCEDURE — 85025 COMPLETE CBC W/AUTO DIFF WBC: CPT

## 2025-01-31 PROCEDURE — 84146 ASSAY OF PROLACTIN: CPT

## 2025-01-31 PROCEDURE — 80053 COMPREHEN METABOLIC PANEL: CPT

## 2025-01-31 PROCEDURE — 84443 ASSAY THYROID STIM HORMONE: CPT

## 2025-01-31 PROCEDURE — 83001 ASSAY OF GONADOTROPIN (FSH): CPT

## 2025-02-02 LAB — H PYLORI AG STL QL IA: NEGATIVE

## 2025-02-03 ENCOUNTER — TRANSCRIBE ORDERS (OUTPATIENT)
Dept: ADMINISTRATIVE | Facility: HOSPITAL | Age: 35
End: 2025-02-03

## 2025-02-03 DIAGNOSIS — E28.2 POLYCYSTIC OVARIAN SYNDROME: Primary | ICD-10-CM

## 2025-02-06 ENCOUNTER — TRANSCRIBE ORDERS (OUTPATIENT)
Dept: ADMINISTRATIVE | Facility: HOSPITAL | Age: 35
End: 2025-02-06

## 2025-02-06 DIAGNOSIS — E28.2 POLYCYSTIC OVARIAN SYNDROME: Primary | ICD-10-CM

## 2025-02-07 ENCOUNTER — TRANSCRIBE ORDERS (OUTPATIENT)
Dept: LAB | Facility: HOSPITAL | Age: 35
End: 2025-02-07

## 2025-02-07 ENCOUNTER — LAB (OUTPATIENT)
Dept: LAB | Facility: HOSPITAL | Age: 35
End: 2025-02-07

## 2025-02-07 DIAGNOSIS — E28.2 POLYCYSTIC OVARIES: ICD-10-CM

## 2025-02-07 DIAGNOSIS — E28.2 POLYCYSTIC OVARIES: Primary | ICD-10-CM

## 2025-02-07 LAB
ALBUMIN SERPL-MCNC: 4.4 G/DL (ref 3.5–5.2)
ALP SERPL-CCNC: 66 U/L (ref 39–117)
ALT SERPL W P-5'-P-CCNC: <5 U/L (ref 1–33)
AST SERPL-CCNC: 12 U/L (ref 1–32)
BILIRUB CONJ SERPL-MCNC: 0.2 MG/DL (ref 0–0.3)
BILIRUB INDIRECT SERPL-MCNC: 0.4 MG/DL
BILIRUB SERPL-MCNC: 0.6 MG/DL (ref 0–1.2)
CHOLEST SERPL-MCNC: 190 MG/DL (ref 0–200)
ESTRADIOL SERPL HS-MCNC: 124 PG/ML
GLUCOSE P FAST SERPL-MCNC: 94 MG/DL (ref 74–106)
HDLC SERPL-MCNC: 45 MG/DL (ref 40–60)
LDLC SERPL CALC-MCNC: 129 MG/DL (ref 0–100)
LDLC/HDLC SERPL: 2.84 {RATIO}
PROGEST SERPL-MCNC: 12.5 NG/ML
PROT SERPL-MCNC: 7.9 G/DL (ref 6–8.5)
TESTOST SERPL-MCNC: 34 NG/DL (ref 8.4–48.1)
TRIGL SERPL-MCNC: 85 MG/DL (ref 0–150)
VLDLC SERPL-MCNC: 16 MG/DL (ref 5–40)

## 2025-02-07 PROCEDURE — 83498 ASY HYDROXYPROGESTERONE 17-D: CPT

## 2025-02-07 PROCEDURE — 82627 DEHYDROEPIANDROSTERONE: CPT

## 2025-02-07 PROCEDURE — 82670 ASSAY OF TOTAL ESTRADIOL: CPT

## 2025-02-07 PROCEDURE — 84144 ASSAY OF PROGESTERONE: CPT

## 2025-02-07 PROCEDURE — 83525 ASSAY OF INSULIN: CPT

## 2025-02-07 PROCEDURE — 80061 LIPID PANEL: CPT

## 2025-02-07 PROCEDURE — 36415 COLL VENOUS BLD VENIPUNCTURE: CPT

## 2025-02-07 PROCEDURE — 84403 ASSAY OF TOTAL TESTOSTERONE: CPT

## 2025-02-07 PROCEDURE — 82947 ASSAY GLUCOSE BLOOD QUANT: CPT

## 2025-02-07 PROCEDURE — 80076 HEPATIC FUNCTION PANEL: CPT

## 2025-02-08 LAB
DHEA-S SERPL-MCNC: 126 UG/DL (ref 84.8–378)
INSULIN SERPL-ACNC: 5.8 UIU/ML (ref 2.6–24.9)

## 2025-02-10 ENCOUNTER — TELEPHONE (OUTPATIENT)
Dept: MAMMOGRAPHY | Facility: HOSPITAL | Age: 35
End: 2025-02-10

## 2025-02-10 NOTE — TELEPHONE ENCOUNTER
Returned patient call.  Patient had questions regarding how to pay for surgery at [facility].  Offered contact information for their office so she can discuss her concerns with them. Patient states she has their number and has no other questions.

## 2025-02-15 ENCOUNTER — HOSPITAL ENCOUNTER (OUTPATIENT)
Facility: HOSPITAL | Age: 35
Discharge: HOME | End: 2025-02-15

## 2025-02-15 DIAGNOSIS — E28.2 POLYCYSTIC OVARIAN SYNDROME: ICD-10-CM

## 2025-02-15 LAB — 17OHP SERPL-MCNC: 235 NG/DL

## 2025-02-15 PROCEDURE — 76856 US EXAM PELVIC COMPLETE: CPT

## 2025-02-24 ENCOUNTER — OFFICE VISIT (OUTPATIENT)
Dept: FAMILY MEDICINE CLINIC | Facility: CLINIC | Age: 35
End: 2025-02-24

## 2025-02-24 ENCOUNTER — PATIENT MESSAGE (OUTPATIENT)
Dept: FAMILY MEDICINE CLINIC | Facility: CLINIC | Age: 35
End: 2025-02-24

## 2025-02-24 VITALS
DIASTOLIC BLOOD PRESSURE: 78 MMHG | HEIGHT: 61 IN | BODY MASS INDEX: 22.54 KG/M2 | HEART RATE: 83 BPM | WEIGHT: 119.4 LBS | OXYGEN SATURATION: 100 % | SYSTOLIC BLOOD PRESSURE: 114 MMHG

## 2025-02-24 DIAGNOSIS — G47.9 DIFFICULTY SLEEPING: ICD-10-CM

## 2025-02-24 DIAGNOSIS — R40.0 DAYTIME SOMNOLENCE: ICD-10-CM

## 2025-02-24 DIAGNOSIS — B96.81 HELICOBACTER PYLORI GASTRITIS: Primary | ICD-10-CM

## 2025-02-24 DIAGNOSIS — G47.8 NON-RESTORATIVE SLEEP: ICD-10-CM

## 2025-02-24 DIAGNOSIS — N93.9 ABNORMAL UTERINE BLEEDING: Primary | ICD-10-CM

## 2025-02-24 DIAGNOSIS — R51.9 MORNING HEADACHE: ICD-10-CM

## 2025-02-24 DIAGNOSIS — F41.9 ANXIETY: Primary | ICD-10-CM

## 2025-02-24 DIAGNOSIS — K29.70 HELICOBACTER PYLORI GASTRITIS: Primary | ICD-10-CM

## 2025-02-24 NOTE — PROGRESS NOTES
Established Patient Office Visit      Patient Name: Reyes Peguero  : 1990   MRN: [id number]   Care Team: Patient Care Team:  Skip Burroughs DO as PCP - General (Family Medicine)    Chief Complaint:    Chief Complaint   Patient presents with    Vaginal Bleeding     1 month follow up, doing better than before.       History of Present Illness: Reyes Peguero is a 34 y.o. female who is here today for chief complaint.    HPI    Issue with sleeping, wakes with a headache every day.  She also reports severe daytime somnolence and typically necessitates a nap in the afternoon.  She also finds herself falling asleep at inappropriate times including while waiting for appointments.  She has had lab workup which did not indicate a source.  She has also been waking up with daily headaches.  Had EEG previously    Rankin Sleepiness Scale  Sitting and reading: High chance of dozing  Watching TV: High chance of dozing  Sitting, inactive in a public place (e.g. a theatre or a meeting): Moderate chance of dozing  As a passenger in a car for an hour without a break: High chance of dozing  Lying down to rest in the afternoon when circumstances permit: High chance of dozing  Sitting and talking to someone: Moderate chance of dozing  Sitting quietly after a lunch without alcohol: High chance of dozing  In a car, while stopped for a few minutes in traffic: Slight chance of dozing  Total score: 20      This patient is accompanied by their self who contributes to the history of their care.    The following portions of the patient's history were reviewed and updated as appropriate: allergies, current medications, past family history, past medical history, past social history, past surgical history and problem list.    Subjective      Review of Systems:   Review of Systems - See HPI    Past Medical History:   Past Medical History:   Diagnosis Date    GERD (gastroesophageal reflux disease)      Irritable bowel syndrome     Peptic ulceration        Past Surgical History:   Past Surgical History:   Procedure Laterality Date    TONSILLECTOMY         Family History:   Family History   Problem Relation Age of Onset    Heart disease Mother     Hyperlipidemia Mother     Diabetes Father     Hyperlipidemia Father     Breast cancer Paternal Aunt     Colon cancer Neg Hx     Esophageal cancer Neg Hx        Social History:   Social History     Socioeconomic History    Marital status: Single   Tobacco Use    Smoking status: Never    Smokeless tobacco: Never   Vaping Use    Vaping status: Never Used   Substance and Sexual Activity    Alcohol use: Never    Drug use: Never       Tobacco History:   Social History     Tobacco Use   Smoking Status Never   Smokeless Tobacco Never       Medications:   Outpatient Medications Prior to Visit   Medication Sig Dispense Refill    calcium carbonate (TUMS) 500 MG chewable tablet Chew 1 tablet Daily As Needed for Indigestion or Heartburn.      naproxen (NAPROSYN) 500 MG tablet Take 1 tablet by mouth 2 (Two) Times a Day As Needed for Moderate Pain. Take with food 60 tablet 0    vitamin D (ERGOCALCIFEROL) 1.25 MG (63748 UT) capsule capsule Take 1 capsule by mouth 1 (One) Time Per Week. 12 capsule 0    sucralfate (Carafate) 1 g tablet Take 1 tablet by mouth 4 (Four) Times a Day. (Patient not taking: Reported on 2/24/2025) 120 tablet 1    amoxicillin (AMOXIL) 500 MG capsule Take 2 capsules by mouth 2 (Two) Times a Day. (Patient not taking: Reported on 2/24/2025) 56 capsule 0    clarithromycin (BIAXIN) 500 MG tablet Take 1 tablet by mouth 2 (Two) Times a Day. (Patient not taking: Reported on 2/24/2025) 28 tablet 0    fexofenadine (Allegra Allergy) 180 MG tablet Take 1 tablet by mouth Daily. (Patient not taking: Reported on 2/24/2025) 90 tablet 3     No facility-administered medications prior to visit.        Allergies:   No Known Allergies    Objective   Objective     Physical Exam:  Vital  "Signs:   Vitals:    02/24/25 1451   BP: 114/78   Pulse: 83   SpO2: 100%   Weight: 54.2 kg (119 lb 6.4 oz)   Height: 154.9 cm (60.98\")     Body mass index is 22.57 kg/m².     Physical Exam  Nursing note reviewed  Const: NAD, A&Ox4, Pleasant, Cooperative  Eyes: EOMI, no conjunctivitis  ENT: No nasal discharge present, neck supple  Cardiac: Regular rate and rhythm, no cyanosis  Resp: Respiratory rate within normal limits, no increased work of breathing, no audible wheezing or retractions noted  GI: No distention or ascites  MSK: Motor and sensation grossly intact in bilateral upper extremities  Neurologic: CN II-XII grossly intact  Psych: Appropriate mood and behavior.  Skin: Warm, dry  Procedures/Radiology     Procedures  No radiology results for the last 7 days     Assessment & Plan   Assessment / Plan      Assessment/Plan:   Problems Addressed This Visit  Diagnoses and all orders for this visit:    1. Abnormal uterine bleeding (Primary)    2. Difficulty sleeping  -     Home Sleep Study; Future    3. Non-restorative sleep  -     Home Sleep Study; Future    4. Daytime somnolence  -     Home Sleep Study; Future    5. Morning headache  -     Home Sleep Study; Future      Problem List Items Addressed This Visit    None  Visit Diagnoses       Abnormal uterine bleeding    -  Primary    Difficulty sleeping        Relevant Orders    Home Sleep Study    Non-restorative sleep        Relevant Orders    Home Sleep Study    Daytime somnolence        Relevant Orders    Home Sleep Study    Morning headache        Relevant Orders    Home Sleep Study            There are no Patient Instructions on file for this visit.    Follow Up:   No follow-ups on file.        DO RUDY Hawkins RD  [facility]  [address]  [facility] [address]  Fax [phone]  Phone [phone]    Disclaimer to patients: The 21st Century Cares Act makes medical notes like these " available to patients in the interest of transparency. However, please be advised that this is still a medical document. It is intended as [id number] communication. Many sections may include medical language or jargon, abbreviations, and additional verbiage that are unfamiliar or confusing. In some ways it may come across as blunt, direct, or may be summarized in order to clearly and concisely communicate the most crucial information to medical professionals. It may also include mentions of conditions that are unlikely but considered as part of the differential diagnosis, including serious disorders. These are not always discussed at length at the time of appointment because their likelihood is so low, but may be included in a medical note to make it clear what has been considered and/or ruled out as part of a work-up. Medical documents are intended to carry relevant information, facts as evident, and the personal clinical opinion of the physician. If you have any questions regarding this medical document, please bring them to the attention of the physician at your next scheduled appointment.

## 2025-02-25 ENCOUNTER — TELEPHONE (OUTPATIENT)
Dept: FAMILY MEDICINE CLINIC | Facility: CLINIC | Age: 35
End: 2025-02-25

## 2025-03-11 ENCOUNTER — LAB (OUTPATIENT)
Dept: LAB | Facility: HOSPITAL | Age: 35
End: 2025-03-11

## 2025-03-11 PROCEDURE — 87338 HPYLORI STOOL AG IA: CPT | Performed by: PHYSICIAN ASSISTANT

## 2025-03-12 ENCOUNTER — RESULTS FOLLOW-UP (OUTPATIENT)
Dept: GASTROENTEROLOGY | Facility: CLINIC | Age: 35
End: 2025-03-12

## 2025-03-12 LAB — H PYLORI AG STL QL IA: NEGATIVE

## 2025-03-12 NOTE — LETTER
Reyes Peguero  [address]  [address]  [address]  [place] [address]    March 12, 2025     Dear Ms. Peguero:    Below are the results from your recent visit:    Resulted Orders   H. Pylori Antigen, Stool - Stool, Per Rectum   Result Value Ref Range    H pylori Ag, Stl Negative Negative       H Pylori stool test negative.              Sincerely,        Maricel Medina PA-C

## 2025-03-20 ENCOUNTER — OFFICE VISIT (OUTPATIENT)
Age: 35
End: 2025-03-20
Payer: MEDICAID

## 2025-03-20 VITALS
BODY MASS INDEX: 22.64 KG/M2 | OXYGEN SATURATION: 98 % | DIASTOLIC BLOOD PRESSURE: 74 MMHG | SYSTOLIC BLOOD PRESSURE: 112 MMHG | WEIGHT: 119.9 LBS | HEART RATE: 94 BPM | HEIGHT: 61 IN

## 2025-03-20 DIAGNOSIS — F43.23 ADJUSTMENT DISORDER WITH MIXED ANXIETY AND DEPRESSED MOOD: ICD-10-CM

## 2025-03-20 DIAGNOSIS — F43.9 TRAUMA AND STRESSOR-RELATED DISORDER: Primary | ICD-10-CM

## 2025-03-20 DIAGNOSIS — Z51.81 ENCOUNTER FOR THERAPEUTIC DRUG MONITORING: ICD-10-CM

## 2025-03-20 RX ORDER — ACETAMINOPHEN 500 MG
500 TABLET ORAL EVERY 6 HOURS PRN
COMMUNITY

## 2025-03-20 NOTE — PROGRESS NOTES
New Patient Office Visit      Date: 2025  Patient Name: Reyes Peguero  : 1990   MRN: [id number]   Care Team: Patient Care Team:  Skip Burroughs DO as PCP - General (Family Medicine)    Referring Provider: Skip Burroughs DO    Chief Complaint:      ICD-10-CM ICD-9-CM   1. Trauma and stressor-related disorder  F43.9 309.81     308.9   2. Adjustment disorder with mixed anxiety and depressed mood  F43.23 309.28   3. Encounter for therapeutic drug monitoring  Z51.81 V58.83        Reyes Peguero is a 34 y.o. female who is here today for anxiety. This is their initial encounter with this provider.  History of Present Illness  She was previously diagnosed with depression in 2016 in [place], which was associated with insomnia. Despite undergoing a sleep study that yielded normal results, she was prescribed medication, the name of which she does not recall. This treatment was discontinued after a month due to its ineffectiveness. She sought alternative therapies such as gym exercise and meditation, which provided some relief but did not improve her sleep quality. During the COVID-19 pandemic in , she consulted a psychiatrist who prescribed citalopram, an SSRI. However, she experienced significant side effects, including decreased concentration, persistent dizziness, and emotional numbness, leading to discontinuation of the medication.  She has tried various antidepressants, including tricyclic antidepressants, but discontinued them due to side effects.    She has no history of psychiatric hospitalizations or self-harming behaviors. She reports suicidal ideations, attributing them to relationship issues. [place] in the [place] since , initially with excitement and career aspirations, but now feels stressed and anxious. She reports financial struggles and difficulty focusing on her studies, which she believes are exacerbated by her anxiety. 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 [place] alone in 2022 and has made some friends but remains isolated.. She has attempted to find a suitable therapist but has been unsuccessful. She has tried online therapy but found it unhelpful. She has also tried magnesium glycinate for sleep. She has not considered propranolol for anxiety due to her low blood pressure. She has a history of thyroid hormone irregularity in 2023, which was managed with medication.      Patient reports multiple stressful and traumatic events that happened in relatively short period of time.  She thinks her stress overshadows her depression. She failed her medical exam in Oct and her lifelong dream [place] to be [place]. Pt feels she has not accomplished anything since being in the [place] and is strained financially. Pt states she is focused and can study but the anxiety is distracting her allowing her only a short time to study. She has confidence she knows the material and was better prepared than her peers, however, they passed the exam and she failed. She reports difficulty concentrating during  stressful events and feels overwhelmed by daily tasks,like cooking.     She experiences constant fatigue and lack of energy. . She feels she doesn't have the energy to socialize with people anymore although she used to be social. She is uncertain if she should give up on her dream because she is afraid of failing again and is unmotivated to look for a job or socialize. All she does is lie in the bed. She was living with room mate 9 mos that did not go well and added to her stress so she moved out to live on her own. She feels her room mate took advantage of her. Reports she had nightmares of her and feels it was traumatizing. She currently has someone trying to befriend her and notices she is standoffish due to this experience.    In 2023 she tried to find a partner but she has  difficulty finding a [employer] male accepting of her independence.  She has a history of benign breast mass which increased her stress and anxiety. It  was biopsied in September 2024 and removed surgically.        SLEEP/APPETITE  She reports poor sleep quality and excessive daytime sleepiness.  She finds menial tasks like cooking for herself is overwhelming. Reports she used to be active and go the the gym but is unable to find the motivation to do so.      SOCIAL HISTORY  Patient was born in [place] and has 2 younger  brothers. She is very involved with her family. Father is diabetic and spent most of time in hospital, so mom was the one that controlled the home. 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 The experience was stressful due to lack of resources. She feels driven to help ppl with limited resources and wants to help people. She Used to work in [employer] in [employer] Moved to  2 years ago. Came here alone and met people after she got here. Feels she has matured but needs to work on boundaries. Feels she gets taken advantage of because she is overly nice.  She reports a history of trauma related to her exam and relationships. No wartime trauma     FAMILY HISTORY  Her father is diabetic and has peripheral vascular disease, which progressed over time and led to the amputation of his legs. Her mother and father both struggle with anxiety and depression.    SUBSTANCE USE/LEGAL HX  denies      Diagnosis:anxiety  Medications: none  Therapy: none recent    Subjective      Review of Systems:   Review of Systems   Constitutional:  Positive for activity change, appetite change and fatigue.   Psychiatric/Behavioral:  Positive for decreased concentration, sleep disturbance, depressed mood and stress. The patient is nervous/anxious.        Depression Screening:  Patient screened positive for depression based on a PHQ-9 score of 23 on 3/20/2025. Follow-up recommendations  include: Referral to Mental Health specialist.      PHQ-9 Depression Screening  Little interest or pleasure in doing things? Nearly every day   Feeling down, depressed, or hopeless? More than half the days   PHQ-2 Total Score 5   Trouble falling or staying asleep, or sleeping too much? Nearly every day   Feeling tired or having little energy? Nearly every day   Poor appetite or overeating? More than half the days   Feeling bad about yourself - or that you are a failure or have let yourself or your family down? Nearly every day   Trouble concentrating on things, such as reading the newspaper or watching television? Nearly every day   Moving or speaking so slowly that other people could have noticed? Or the opposite - being so fidgety or restless that you have been moving around a lot more than usual? Nearly every day     Thoughts that you would be better off dead, or of hurting yourself in some way? Several days   PHQ-9 Total Score 23   If you checked off any problems, how difficult have these problems made it for you to do your work, take care of things at home, or get along with other people? Very difficult             [place]ble Suicide Severity Rating Scale (Screener/Recent Self-Report)  1. Wish to be Dead (Past 1 Month): Yes  2. Non-Specific Active Suicidal Thoughts (Past 1 Month): No  6. Suicidal Behavior (Lifetime): No  Calculated C-SSRS Risk Score (Lifetime/Recent): Low Risk     MELY-7      Over the last two weeks, how often have you been bothered by the following problems?  Feeling nervous, anxious or on edge: Nearly every day  Not being able to stop or control worrying: More than half the days  Worrying too much about different things: Nearly every day  Trouble Relaxing: More than half the days  Being so restless that it is hard to sit still: More than half the days  Becoming easily annoyed or irritable: More than half the days  Feeling afraid as if something awful might happen: More than half the days  MELY 7  Total Score: 16  If you checked any problems, how difficult have these problems made it for you to do your work, take care of things at home, or get along with other people: Very difficult      Past Psychiatric History:   History of outpatient psychiatrist: yes, in [place] 2016  Diagnoses: anxiety  History of outpatient therapy:  a few times that did not work out;some language barrier  Previous Inpatient hospitalizations: none  Previous medication trials:   TCA  Celexa-dizzy  History of suicide/self harm attempts: none     Abuse/trauma History:              Physical: denies              Sexual: denies              Emotional/Neglect: denies              Significant death/loss: denies              Other trauma: denies              Head Injury:denies    Triggers: (Persons/Places/Things/Events/Thought/Emotions): many    Substance Abuse History/Last use:              Alcohol: denies              Tobacco/Vape: denies              Illicit Drugs: denies              Seizures:denies              Caffeine: denies    Legal History:     Work History:               Highest level of education obtained: college               History? no              Patient's Occupation: FT student    Interpersonal/Relational:              Marital Status: not               Support system:  brother     Social History:  Where was patient born: [place]  Upbringing: parents  Where does patient currently live: [facility]  Living situation: alone  Leisure and Recreation: staying home  Ep[employer]alian: [employer]    Family History:   Family History   Problem Relation Age of Onset    Heart disease Mother     Hyperlipidemia Mother     Diabetes Father     Hyperlipidemia Father     Breast cancer Paternal Aunt     Colon cancer Neg Hx     Esophageal cancer Neg Hx        Past Medical History:   Past Medical History:   Diagnosis Date    GERD (gastroesophageal reflux disease)     Irritable bowel syndrome     Peptic ulceration        Past Surgical History:  "  Past Surgical History:   Procedure Laterality Date    COLONOSCOPY  12/2024    TONSILLECTOMY         Medications:     Current Outpatient Medications:     acetaminophen (TYLENOL) 500 MG tablet, Take 1 tablet by mouth Every 6 (Six) Hours As Needed for Mild Pain., Disp: , Rfl:     calcium carbonate (TUMS) 500 MG chewable tablet, Chew 1 tablet Daily As Needed for Indigestion or Heartburn., Disp: , Rfl:     naproxen (NAPROSYN) 500 MG tablet, Take 1 tablet by mouth 2 (Two) Times a Day As Needed for Moderate Pain. Take with food (Patient not taking: Reported on 3/20/2025), Disp: 60 tablet, Rfl: 0    sucralfate (Carafate) 1 g tablet, Take 1 tablet by mouth 4 (Four) Times a Day. (Patient not taking: Reported on 1/23/2025), Disp: 120 tablet, Rfl: 1    vitamin D (ERGOCALCIFEROL) 1.25 MG (11556 UT) capsule capsule, Take 1 capsule by mouth 1 (One) Time Per Week. (Patient not taking: Reported on 3/20/2025), Disp: 12 capsule, Rfl: 0    Medication Considerations:  TYREL reviewed and appropriate.      Allergies:   No Known Allergies      Objective     Physical Exam  Vital Signs  The patient's heart rate is 94 and blood pressure is 112/74.  Vital Signs:   Vitals:    03/20/25 1411   BP: 112/74   Pulse: 94   SpO2: 98%   Weight: 54.4 kg (119 lb 14.4 oz)   Height: 154.9 cm (60.98\")     Body mass index is 22.67 kg/m².     Mental Status Exam:   MENTAL STATUS EXAM   General Appearance:  Cleanly groomed and dressed, well developed and other  Other Comment:  Wearing head covering  Eye Contact:  Good eye contact  Attitude:  Cooperative  Motor Activity:  Normal gait, posture  Muscle Strength:  Normal  Speech:  Normal rate, tone, volume  Language:  Spontaneous and stereotypical  Mood and affect:  Depressed  Hopelessness:  Denies  Loneliness: Denies  Thought Process:  Logical and goal-directed  Associations/ Thought Content:  No delusions  Hallucinations:  None  Suicidal Ideations:  Not present  Homicidal Ideation:  Not present  Sensorium:  " Alert and clear  Orientation:  Person, place, time and situation  Immediate Recall, Recent, and Remote Memory:  Intact  Attention Span/ Concentration:  Good  Fund of Knowledge:  Appropriate for age and educational level  Intellectual Functioning:  Average range  Insight:  Good  Judgement:  Good  Reliability:  Good  Impulse Control:  Good         @RESULASTCBCDIFFPANEL,TSH,LABLIPI,WJCBLIAT66,TILBTNRU03,MG,FOLATE,PROLACTIN,CRPRESULT,CMP,X8JJRNPLYIN)@    Lab Results   Component Value Date    GLUCOSE 84 01/31/2025    BUN 7 01/31/2025    CREATININE 0.71 01/31/2025    EGFR 115.3 01/31/2025    BCR 9.9 01/31/2025    K 3.9 01/31/2025    CO2 22.4 01/31/2025    CALCIUM 9.1 01/31/2025    ALBUMIN 4.4 02/07/2025    BILITOT 0.6 02/07/2025    AST 12 02/07/2025    ALT <5 02/07/2025       Lab Results   Component Value Date    WBC 2.80 (L) 01/31/2025    HGB 12.3 01/31/2025    HCT 37.2 01/31/2025    MCV 86.3 01/31/2025     01/31/2025       Lab Results   Component Value Date    CHOL 190 02/07/2025    TRIG 85 02/07/2025    HDL 45 02/07/2025     (H) 02/07/2025      Latest Reference Range & Units 01/31/25 10:38   TSH Baseline 0.270 - 4.200 uIU/mL 1.800      Latest Reference Range & Units 07/03/23 13:47   Vitamin B-12 211 - 946 pg/mL 783      Latest Reference Range & Units 11/18/24 14:47   25 Hydroxy, Vitamin D 30.0 - 100.0 ng/ml 20.6 (L)   (L): Data is abnormally low  Results             Assessment / Plan      Visit Diagnosis/Orders Placed This Visit:  .Diagnoses and all orders for this visit:    1. Trauma and stressor-related disorder (Primary)  -     Ambulatory Referral to Behavioral Health    2. Adjustment disorder with mixed anxiety and depressed mood  -     Ambulatory Referral to Behavioral Health    3. Encounter for therapeutic drug monitoring  -     ToxAssure Flex 23, Ur - Urine, Clean Catch       Assessment & Plan  1. Anxiety.  She reports increased stress and anxiety, particularly related to her recent move to the ,  financial struggles, and failing her medical exam. She experiences difficulty concentrating, feeling overwhelmed, and lack of motivation. A referral to [facility] for possible  Acceptance and Commitment Therapy (ACT) (if appropriate) has been initiated to help process her trauma and learn to set healthy boundaries. Medication options discussed include Lamictal, BuSpar, Strattera, or  propranolol. Strattera has been recommended for its potential benefits in improving energy and motivation without increasing anxiety. Potential side effects of Strattera, including headache, abdominal pain, urinary hesitancy, insomnia, and fatigue, were discussed. A pamphlet on [employer] testing was provided for further information on how her body metabolizes medications.    2. Depression.  She has a history of depression diagnosed in 2016, with symptoms including low mood, insomnia, and lack of motivation. Previous treatments with SSRIs like citalopram were discontinued due to side effects such as dizziness and emotional numbness. Non-pharmacological interventions such as light box therapy were suggested. The potential benefits of magnesium glycinate and L-theanine were also discussed. Pt will notify the office if she decides to pursue med tx.     Follow-up  The patient will follow up in 3 months.    PLAN:  Referral to [facility] for therapy  [facility] pamphlet given  Pt will call clinic if she decides to start medication.    Needs to take her prescribed vit D daily with food    -Nonmedicinal methods used to decrease anxiety and improve sleep were discussed at length. These include benefits of decreased caffeine consumption, utilization of a weighted blanket, avoiding screen two hours prior to sleep or using blue blocker glasses, sleeping in a dark room, avoid daytime naps,  use bed only for sleeping, and using  increasing daytime activity as permitted. Melatonin 1-5 mg HS prn or Magnesium Glycinate up to 400 mg HS prn can be used to aid  sleep.    -The benefits  of consuming a healthy diet, minimizing caffeine intake and engaging in  daily exercise were discussed with patient. Patient encouraged to consider lifestyle modification regarding diet and exercise patterns to maximize results of mental health treatment.    Impression/Formulation: Patient appears alert and oriented. Treatment and medication options discussed during today's visit.  Opportunity provided for any necessary clarification and patient questions.  Patient acknowledges and verbally consents to proceed with mutually agreed upon treatment plan. Patient is voluntarily requesting to begin outpatient treatment at [facility] [address].  Patient is receptive to assistance with maintaining a stable lifestyle.  Patient presents with history of     ICD-10-CM ICD-9-CM   1. Trauma and stressor-related disorder  F43.9 309.81     308.9   2. Adjustment disorder with mixed anxiety and depressed mood  F43.23 309.28   3. Encounter for therapeutic drug monitoring  Z51.81 V58.83   .     Treatment Plan: Patient will pursue/Continue supportive psychotherapy and take medications as prescribed. Provider instructed patient to obtain psychiatric medication from this provider only to prevent polypharmacy and possible overprescribing or unsafe medication combinations. Patient agrees to contact this office, call 911 or present to the nearest emergency room should suicidal or homicidal ideations occur. Discussed medication options and treatment plan of prescribed medication(s) as well as the risks, benefits, and potential side effects. Patient ackowledged and verbally consents to continue with mutually agreed upon   treatment plan and was educated on the importance of compliance with treatment and follow-up appointments.     MEDICATION Treatment: Discussed medication treatment options and plan of prescribed medication. Potential risks, benefits, and side effects including but not  limited to the following reviewed: Black Box warnings, worsening symptoms, SI, sedation, GI side effects, metabolic alterations and blood pressure fluctuations. Patient is reminded to refrain from illicit substance use, including alcohol and THC while taking medications. Also advised to refrain from activity requiring  alertness until sedative effects of medication are assessed.     Prognosis: Fair with Ongoing Treatment  . Unique factors influencing symptom alleviation/remission include: pre-existing conditions, symptom chronicity, symptom severity, degree of impairment, social support, financial security, motivation, patient engagement and medication adherence. Prognosis is largely dependent  on patient's adherence to medication treatment plan, follow up appointments and willingness to engage in psychotherapy    Functional Status: Moderate impairment      Short-term goals: Patient will adhere to medication regimen and experience continued improvement in symptoms over the next 3 months.   Long-term goals: Patient will adhere to medication treatment plan and report improvement in symptoms over the next 6 months      Quality Measures:     TOBACCO USE:  Tobacco Use: Low Risk  (3/20/2025)    Patient History     Smoking Tobacco Use: Never     Smokeless Tobacco Use: Never     Passive Exposure: Not on file      Never smoker    I advised Reyes of the risks of tobacco use.     Follow Up:   Return in about 3 months (around 6/20/2025).    Abigail Lim [facility]  [address]       Patient or patient representative verbalized consent for the use of Ambient Listening during the visit with  GUS Richardson for chart documentation. 3/20/2025  14:10 EDT

## 2025-03-25 ENCOUNTER — TELEPHONE (OUTPATIENT)
Age: 35
End: 2025-03-25
Payer: MEDICAID

## 2025-03-25 RX ORDER — BUSPIRONE HYDROCHLORIDE 10 MG/1
TABLET ORAL
Qty: 113 TABLET | Refills: 0 | Status: SHIPPED | OUTPATIENT
Start: 2025-03-25 | End: 2025-05-24

## 2025-03-25 NOTE — TELEPHONE ENCOUNTER
PT called asking to speak with Allyson regarding a prescription.  During her last appt she said Allyson discussed several medications with her and ask her to callback if she decided to take one of them.  She said the one she wants to take is the Buspar.  I told her a message would be sent regarding her request but she would like to speak with Allyson also

## 2025-04-11 ENCOUNTER — OFFICE VISIT (OUTPATIENT)
Dept: OBSTETRICS AND GYNECOLOGY | Facility: CLINIC | Age: 35
End: 2025-04-11

## 2025-04-11 VITALS
WEIGHT: 120 LBS | BODY MASS INDEX: 22.66 KG/M2 | SYSTOLIC BLOOD PRESSURE: 100 MMHG | DIASTOLIC BLOOD PRESSURE: 60 MMHG | HEIGHT: 61 IN

## 2025-04-11 DIAGNOSIS — N92.0 MENORRHAGIA WITH REGULAR CYCLE: ICD-10-CM

## 2025-04-11 DIAGNOSIS — Z01.419 ENCOUNTER FOR WELL WOMAN EXAM WITH ROUTINE GYNECOLOGICAL EXAM: Primary | ICD-10-CM

## 2025-04-11 RX ORDER — DROSPIRENONE AND ETHINYL ESTRADIOL 0.02-3(28)
1 KIT ORAL DAILY
Qty: 84 TABLET | Refills: 0 | Status: SHIPPED | OUTPATIENT
Start: 2025-04-11 | End: 2026-04-11

## 2025-04-11 NOTE — PROGRESS NOTES
Subjective   Chief Complaint   Patient presents with    Annual Exam     Spotting off and on    Vaginal Bleeding     Reyes Peguero is a 34 y.o. year old No obstetric history on file. presenting to be seen for her annual exam. She is overall doing well but does have some concerns regarding her periods today.     SEXUAL Hx:  She is not currently sexually active.  In the past year there she has not been sexually active.  She has never been sexually active.   Condoms are not needed because she is not sexually active.  She would not like to be screened for STD's at today's exam.  Current birth control method: abstinence.  She is happy with her current method of contraception and does not want to discuss alternative methods of contraception.  MENSTRUAL Hx:  Patient's last menstrual period was 03/27/2025.  In the past 6 months her cycles have been regular, predictable and occur monthly.  Her menstrual flow is typically moderately heavy.   Each month on average there are roughly 0 day(s) of very heavy flow.  The first several days are the heaviest, but she reports spotting and total flow lasts for about 8-10 days.   Intermenstrual bleeding is present.  Spotting for 4-5 days around ovulation time  Post-coital bleeding is n/a.  Dysmenorrhea: severe and affecting her activities of daily living- she reports taking ibuprofen 600 mg twice daily  PMS: moderate and affecting her activities of daily living  Her cycles ARE a source of concern for her that she wishes to discuss today.  HEALTH Hx:  She exercises regularly: yes. She goes to the gym, she walks  She wears her seat belt: yes.  She has concerns about domestic violence: no.  OTHER THINGS SHE WANTS TO DISCUSS TODAY:  Nothing else    The following portions of the patient's history were reviewed and updated as appropriate:problem list, current medications, allergies, past family history, past medical history, past social history, and past surgical  "history.    Social History    Tobacco Use      Smoking status: Never      Smokeless tobacco: Never      Review of Systems   Constitutional: Negative.  Negative for appetite change and diaphoresis.   Respiratory: Negative.     Cardiovascular: Negative.    Gastrointestinal:  Negative for abdominal distention, blood in stool, GERD and indigestion.   Endocrine: Negative.    Genitourinary:  Positive for breast lump. Negative for breast discharge, breast pain, dysuria and hematuria.   Skin: Negative.           Objective   /60   Ht 154.9 cm (61\")   Wt 54.4 kg (120 lb)   LMP 03/27/2025   BMI 22.67 kg/m²     Physical Exam  Vitals and nursing note reviewed.   Constitutional:       Appearance: Normal appearance.   Cardiovascular:      Rate and Rhythm: Normal rate and regular rhythm.      Heart sounds: Normal heart sounds.   Pulmonary:      Effort: Pulmonary effort is normal.      Breath sounds: Normal breath sounds.   Chest:      Comments: Deferred   Genitourinary:     Comments: Deferred   Neurological:      Mental Status: She is alert.   Psychiatric:         Mood and Affect: Mood normal.         Behavior: Behavior normal.         Thought Content: Thought content normal.         Judgment: Judgment normal.            Diagnoses and all orders for this visit:    1. Encounter for well woman exam with routine gynecological exam (Primary)  -     LIQUID-BASED PAP SMEAR WITH HPV GENOTYPING REGARDLESS OF INTERPRETATION ([facility]); Future  -     Cancel: LIQUID-BASED PAP SMEAR WITH HPV GENOTYPING REGARDLESS OF INTERPRETATION ([facility])    2. Menorrhagia with regular cycle    Other orders  -     drospirenone-ethinyl estradiol (MARITZA) 3-0.02 MG per tablet; Take 1 tablet by mouth Daily.  Dispense: 84 tablet; Refill: 0    Pap was not done today.  I explained to Reyes that the recommendations for Pap smears are to start doing PAP smears at 22 years of age- or when she is sexually active.  I told Reyes she still needs to be " seen in our office yearly for an annual visit.    Reviewed hormonal labs, CT of pelvis, and pelvic u/s. No obvious concerns or reasons for heavy menstrual bleeding. Discussed possibility of endometriosis but no way to determine for sure without surgery. Discussed management options including hormonal contraceptives and scheduled NSAIDs. She reports a history of ovarian cysts, as well as PMS symptoms that are bothersome. Recommended hormonal menstrual management to help with more symptoms. She has no ACHES or contraindications to estrogen containing products. Discussed at length potential side effects and expectations for bleeding (may take up to 3 packs to see full results). She voiced understanding and appreciation.     Start OCP's.  A new prescription(s) was created today    The importance of keeping all planned follow-up and taking all medications as prescribed was emphasized.    Today I discussed with Reyes the total recommended calcium intake for a premenopausal female is 1000 mg.  Ideally this should be from dietary sources.  I reviewed calcium content in various foods including milk, fortified orange juice and yogurt.  If she cannot get sufficient calcium through dietary means, it is recommended to supplement with either a multivitamin or calcium to reach her daily goal.  I also reviewed the difference in the bioavailability of calcium carbonate and calcium citrate containing supplements and the importance of taking calcium carbonate containing products with food.  Finally, vitamin D's role in calcium absorption was reviewed and a total daily vitamin D intake of 800 units was recommended.    I discussed with Reyes that she may be behind on needed vaccinations for COVID booster / COVID bivalent booster.  She may be able to obtain these vaccinations at her local pharmacy OR speak about obtaining them with her primary care.  If she does obtain her vaccines, I have asked Reyes to let us know the date each  vaccine was obtained so that her medical record could be updated in our system.    New Medications Ordered This Visit   Medications    drospirenone-ethinyl estradiol (MARITZA) 3-0.02 MG per tablet     Sig: Take 1 tablet by mouth Daily.     Dispense:  84 tablet     Refill:  0            Return in about 3 months (around 7/11/2025) for Medication check.    Mone Winslow, GUS  April 11, 2025

## 2025-04-16 ENCOUNTER — HOSPITAL ENCOUNTER (OUTPATIENT)
Dept: SLEEP MEDICINE | Facility: HOSPITAL | Age: 35
Discharge: HOME | End: 2025-04-16
Admitting: FAMILY MEDICINE

## 2025-04-16 VITALS — HEIGHT: 61 IN | BODY MASS INDEX: 22.64 KG/M2 | WEIGHT: 119.93 LBS

## 2025-04-16 DIAGNOSIS — G47.9 DIFFICULTY SLEEPING: ICD-10-CM

## 2025-04-16 DIAGNOSIS — R51.9 MORNING HEADACHE: ICD-10-CM

## 2025-04-16 DIAGNOSIS — R40.0 DAYTIME SOMNOLENCE: ICD-10-CM

## 2025-04-16 DIAGNOSIS — G47.8 NON-RESTORATIVE SLEEP: ICD-10-CM

## 2025-04-16 PROCEDURE — G0399 HOME SLEEP TEST/TYPE 3 PORTA: HCPCS

## 2025-05-08 ENCOUNTER — TELEMEDICINE (OUTPATIENT)
Dept: PSYCHIATRY | Facility: CLINIC | Age: 35
End: 2025-05-08

## 2025-05-08 DIAGNOSIS — F43.23 ADJUSTMENT DISORDER WITH MIXED ANXIETY AND DEPRESSED MOOD: Primary | ICD-10-CM

## 2025-05-08 DIAGNOSIS — F41.1 GENERALIZED ANXIETY DISORDER: ICD-10-CM

## 2025-05-08 DIAGNOSIS — F33.2 SEVERE EPISODE OF RECURRENT MAJOR DEPRESSIVE DISORDER, WITHOUT PSYCHOTIC FEATURES: ICD-10-CM
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
## 2025-05-30 ENCOUNTER — TELEMEDICINE (OUTPATIENT)
Dept: PSYCHIATRY | Facility: CLINIC | Age: 35
End: 2025-05-30

## 2025-05-30 DIAGNOSIS — F43.23 ADJUSTMENT DISORDER WITH MIXED ANXIETY AND DEPRESSED MOOD: Primary | ICD-10-CM

## 2025-05-30 DIAGNOSIS — F41.1 GENERALIZED ANXIETY DISORDER: ICD-10-CM

## 2025-05-30 DIAGNOSIS — F33.2 SEVERE EPISODE OF RECURRENT MAJOR DEPRESSIVE DISORDER, WITHOUT PSYCHOTIC FEATURES: ICD-10-CM

## 2025-05-30 NOTE — PLAN OF CARE
Patient was engaged in reviewing treatment goals. Patient will develop coping skills to be able to learn to stabilize her emotions to decrease the impact of outside events on her emotional state. Patient will learn coping skills to decrease negative thought patterns.   Problem: Anxiety 9  Goal: Patient will develop and implement behavioral and cognitive strategies to reduce anxiety and irrational fears.  Outcome: Progressing     Problem: Depression 6  Goal: Patient will demonstrate the ability to initiate new constructive life skills outside of sessions on a consistent basis.  Outcome: Progressing

## 2025-05-30 NOTE — TREATMENT PLAN
Multi-Disciplinary Problems (from Behavioral Health Treatment Plan)      Active Problems       Problem: Anxiety  Start Date: 05/30/25      Problem Details: The patient self-scales this problem as a 9 with 10 being the worst. Patient will develop coping skills to stabilize her emotions to decrease the impact of outside events on her emotional state.         Goal Priority Start Date Expected End Date End Date    Patient will develop and implement behavioral and cognitive strategies to reduce anxiety and irrational fears. -- 05/30/25 11/28/25 --    Goal Details: Progress toward goal:  Not appropriate to rate progress toward goal since this is the initial treatment plan.        Goal Intervention Frequency Start Date End Date    Help patient explore past emotional issues in relation to present anxiety. Q3 Weeks 05/30/25 --    Intervention Details: Duration of treatment until discharged.        Goal Intervention Frequency Start Date End Date    Help patient develop an awareness of their cognitive and physical responses to anxiety. Q3 Weeks 05/30/25 --    Intervention Details: Duration of treatment until discharged.                Problem: Depression  Start Date: 05/30/25      Problem Details: The patient self-scales this problem as a 6 with 10 being the worst. Patient will build coping skills to learn how to decrease negative thought patterns.           Goal Priority Start Date Expected End Date End Date    Patient will demonstrate the ability to initiate new constructive life skills outside of sessions on a consistent basis. -- 05/30/25 11/28/25 --    Goal Details: Progress toward goal:  Not appropriate to rate progress toward goal since this is the initial treatment plan.        Goal Intervention Frequency Start Date End Date    Assist patient in setting attainable activities of daily living goals. PRN 05/30/25 --      Goal Intervention Frequency Start Date End Date    Provide education about depression Q3 Weeks  05/30/25 --    Intervention Details: Duration of treatment until discharged.        Goal Intervention Frequency Start Date End Date    Assist patient in developing healthy coping strategies. Q3 Weeks 05/30/25 --    Intervention Details: Duration of treatment until discharged.                        Reviewed By       Amarjit Mckeon, [facility] 05/30/25 1026                     I have discussed and reviewed this treatment plan with the patient.

## 2025-05-30 NOTE — PROGRESS NOTES
[facility]   Follow-up Progress Note     Date: May 30, 2025  Time In: 10:02  Time Out: 11:08      PROGRESS NOTE  Data:  Reyes Peguero is a 34 y.o. female presenting to [facility] (through [facility]), [address], [address] using a secure MyChart Video Visit through [facility] for assessment with Amarjit Mckeon LCSW. The patient is seen remotely in their home using [facility] My Chart. Patient is being seen via telehealth and stated they are in a secure environment for this session. The patient's condition being diagnosed/treated is appropriate for telemedicine. The provider identified herself as well as her credentials. The patient and/or patients guardian consent to be seen remotely, and when consent is given they understand that the consent allows for patient identifiable information to be sent to a third party as needed. They may refuse to be seen remotely at any time. The electronic data is encrypted and password protected, and the patient has been advised of the potential risks to privacy not withstanding such measures.    Today Patient reported that she has been doing well. Patient stated she has started taking the medication that has been helpful to manage her depression symptoms. Patient stated she has experienced some side effects but overall doing well. Patient reported that she has been maintaining consistent daily routines as well. Patient stated she did go for another interview but has not heard back from that job. Patient stated she has started studying for her exam. Patient stated she did seek assistance from a  but did not feel it was helpful. 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 Patient stated she is hopeful to take the exam some  time in July. Patient was engaged in identifying goals for treatment. Patient stated she would like to learn how to decrease negative thought patterns. Patient also expressed she would like to learn how to stabilize her emotions that she is not significantly impacted by events that happen throughout her day. Engaged patient in utilizing journaling as a coping strategy to process her thoughts and emotions.       Clinical Maneuvering/Intervention:    Chief Complaint: adjustment, anxiety, depression    (Scales based on 0 - 10 with 10 being the worst)  Depression: 4 Anxiety: 3     Assisted Patient in processing above session content; acknowledged and normalized patient’s thoughts, feelings, and concerns.  Rationalized patient thought process regarding identifying goals for treatment.  Discussed triggers associated with patient's anxiety.  Also discussed coping skills for patient to implement such as journaling.    Allowed Patient to freely discuss issues  without interruption or judgement with unconditional positive regard, active listening skills, and empathy. Therapist provided a safe, confidential environment to facilitate the development of a positive therapeutic relationship and encouraged open, honest communication. Assisted Patient in identifying risk factors which would indicate the need for higher level of care including thoughts to harm self or others and/or self-harming behavior and encouraged Patient to contact this office, call 911, or present to the nearest emergency room should any of these events occur. Discussed crisis intervention services and means to access. Patient adamantly and convincingly denies current suicidal or homicidal ideation or perceptual disturbance. Assisted Patient in processing session content; acknowledged and normalized Patient’s thoughts, feelings, and concerns by utilizing a person-centered approach in efforts to build appropriate rapport and a positive therapeutic relationship  with open and honest communication. Therapist utilized dialectical behavior techniques to teach and model emotional regulation and relaxation methods. Therapist assisted Patient with identifying and implementing healthier coping strategies.     Assessment   Patient appears to be experiencing heightened anxiety and depression in response to COVID-19 epidemic  As a result, they can be reasonably expected to continue to benefit from treatment and would likely be at increased risk for decompensation otherwise.    Mental Status Exam:   Hygiene:   fair  Cooperation:  Cooperative  Eye Contact:  Good  Psychomotor Behavior:  Appropriate  Affect:  Full range  Mood: depressed, low energy  Speech:  Normal  Thought Process:  Goal directed  Thought Content:  Mood congruent  Suicidal:  None  Homicidal:  None  Hallucinations:  None  Delusion:  None  Memory:  Intact  Orientation:  Person, Place, Time, and Situation  Reliability:  good  Insight:  Good  Judgement:  Good  Impulse Control:  Good  Physical/Medical Issues:  No      PHQ-Score Total:  PHQ-9 Total Score:        Patient's Support Network Includes:  brother, friend    Functional Status: Moderate impairment     Progress toward goal: Not at goal    Prognosis: Good with Ongoing Treatment            Impression/Formulation:    VISIT DIAGNOSIS:     ICD-10-CM ICD-9-CM   1. Adjustment disorder with mixed anxiety and depressed mood  F43.23 309.28   2. Severe episode of recurrent major depressive disorder, without psychotic features  F33.2 296.33   3. Generalized anxiety disorder  F41.1 300.02        Patient appeared alert and oriented.  Patient is voluntarily requesting to continue outpatient therapy at [facility].  Patient is receptive to assistance with maintaining a stable lifestyle.  Patient presents with history of anxiety and depression.  Patient is agreeable to attend routine therapy sessions.  Patient expressed desire to maintain stability  and participate in the therapeutic process.        Crisis Plan:  Symptoms and/or behaviors to indicate a crisis: Excessive worry or fear    What calming techniques or other strategies will patient use to de-esclate and stay safe: slow down, breathe, visualize calming self, think it though, listen to music, change focus, take a walk    Who is one person patient can contact to assist with de-escalation? Friend, brother    If symptoms/behaviors persist, Patient will present to the nearest hospital for an assessment. Advised patient of [facility] ER and assessment services.     Plan:   Patient will continue in individual outpatient therapy with focus on improved functioning and coping skills, maintaining stability, and avoiding decompensation and the need for higher level of care.    Patient will contact this office ([facility] at [phone]), call 911 or present to the nearest emergency room should suicidal or homicidal ideations occur. Provide Cognitive Behavioral Therapy and Solution Focused Therapy to improve functioning, maintain stability, and avoid decompensation and the need for higher level of care.     Return in about 3 weeks, or earlier if symptoms worsen or fail to improve.    Recommended Referrals: none        This document has been electronically signed by Amarjit Mckeon LCSW  May 30, 2025 10:02 EDT        Part of this note may be an electronic transcription/translation of spoken language to printed text using the Dragon Dictation System.      Mode of Visit: Video  Location of patient: -HOME-  Location of provider: +HOME+  You have chosen to receive care through a telehealth visit.  The patient has signed the video visit consent form.  The visit included audio and video interaction. No technical issues occurred during this visit.

## 2025-06-20 ENCOUNTER — OFFICE VISIT (OUTPATIENT)
Age: 35
End: 2025-06-20

## 2025-06-20 VITALS
WEIGHT: 120.6 LBS | BODY MASS INDEX: 22.77 KG/M2 | OXYGEN SATURATION: 98 % | SYSTOLIC BLOOD PRESSURE: 108 MMHG | DIASTOLIC BLOOD PRESSURE: 72 MMHG | HEIGHT: 61 IN | HEART RATE: 85 BPM

## 2025-06-20 DIAGNOSIS — F43.23 ADJUSTMENT DISORDER WITH MIXED ANXIETY AND DEPRESSED MOOD: Primary | ICD-10-CM

## 2025-06-20 DIAGNOSIS — F43.9 TRAUMA AND STRESSOR-RELATED DISORDER: ICD-10-CM

## 2025-06-20 RX ORDER — BUSPIRONE HYDROCHLORIDE 5 MG/1
2.5 TABLET ORAL 2 TIMES DAILY
Qty: 60 TABLET | Refills: 0 | Status: SHIPPED | OUTPATIENT
Start: 2025-06-20

## 2025-06-20 NOTE — PROGRESS NOTES
Office  Follow Up Visit      Patient Name: Reyes Peguero  : 1990   MRN: [id number]     Referring Provider: Skip Burroughs DO    Chief Complaint:       ICD-10-CM ICD-9-CM   1. Adjustment disorder with mixed anxiety and depressed mood  F43.23 309.28   2. Trauma and stressor-related disorder  F43.9 309.81     308.9         Reyes Peguero is a 34 y.o. female who is here today for follow up related to adjustment disorder with anxiety and depressed mood.    History of Present Illness   She was last seen 3 months ago, at which time she was diagnosed with an adjustment disorder,referred to therapy and Buspar was started. She has attended 2 therapy sessions but reports minimal improvement in her condition, stating she is unsure of what to expect.. She has been prescribed buspirone 5 mg twice daily, which she reports has improved her sleep quality. However, attempts to increase the dosage have resulted in dizziness and excessive daytime sleepiness, leading her to revert to the lower dose. Despite this, she does not perceive a significant reduction in her anxiety levels. She has also experimented with taking the medication on alternate days, which she found beneficial, but it increased her daytime sleepiness on the days she took it. She has tried using caffeine or energy drinks to counteract this effect, but these have occasionally triggered anxiety episodes. She has no history of asthma. She has considered using beta blockers for her anxiety but has refrained due to her low blood pressure. She has researched L-theanine as a potential treatment for her anxiety. She believes that the benefits of buspirone outweigh the side effects, which she manages with caffeine or energy drinks. If she misses two doses of buspirone, she experiences feelings of depression, overwhelm, and negativity, often accompanied by crying episodes. She takes buspirone one hour before bedtime, which  induces sleepiness. She has tried taking it during the day but found it caused fatigue. She occasionally splits the tablet with a knife.     She has found magnesium supplements helpful for sleep but does not believe they are as effective as buspirone for stress management. She has been using Vitex agnus-castus, an herbal supplement, to manage spotting before her period and breast pain, which she believes are stress-related. She has undergone an ultrasound and hormone tests, all of which were normal. She reports that the herbal supplement exacerbates her headaches. She is trying to avoid oral contraceptive pills due to their side effects and is seeking natural alternatives. She has previously tried SSRIs without success.    MEDICATIONS  Current: Buspirone, magnesium     Diagnosis:anxiety  Medications: none  Therapy: Ryne Mckeon       Subjective      Review of Systems:   Review of Systems   Constitutional:  Positive for fatigue.   Psychiatric/Behavioral:  Positive for decreased concentration and sleep disturbance. The patient is nervous/anxious.        PHQ-9 Depression Screening  Little interest or pleasure in doing things? Several days   Feeling down, depressed, or hopeless? Several days   PHQ-2 Total Score 2   Trouble falling or staying asleep, or sleeping too much? Several days   Feeling tired or having little energy? Several days   Poor appetite or overeating? Several days   Feeling bad about yourself - or that you are a failure or have let yourself or your family down? More than half the days   Trouble concentrating on things, such as reading the newspaper or watching television? More than half the days   Moving or speaking so slowly that other people could have noticed? Or the opposite - being so fidgety or restless that you have been moving around a lot more than usual? Several days     Thoughts that you would be better off dead, or of hurting yourself in some way? Not at all   PHQ-9 Total Score 10   If you  checked off any problems, how difficult have these problems made it for you to do your work, take care of things at home, or get along with other people? Somewhat difficult         MELY-7      Over the last two weeks, how often have you been bothered by the following problems?  Feeling nervous, anxious or on edge: Several days  Not being able to stop or control worrying: Several days  Worrying too much about different things: Not at all  Trouble Relaxing: Several days  Being so restless that it is hard to sit still: Several days  Becoming easily annoyed or irritable: Several days  Feeling afraid as if something awful might happen: Several days  MELY 7 Total Score: 6  If you checked any problems, how difficult have these problems made it for you to do your work, take care of things at home, or get along with other people: Somewhat difficult    Patient History:   The following portions of the patient's history were reviewed and updated as appropriate: allergies, current medications, past family history, past medical history, past social history, past surgical history and problem list.     Social History     Socioeconomic History    Marital status: Single   Tobacco Use    Smoking status: Never    Smokeless tobacco: Never   Vaping Use    Vaping status: Never Used   Substance and Sexual Activity    Alcohol use: Never    Drug use: Never    Sexual activity: Never          Past Psychiatric History:   History of outpatient psychiatrist: yes, in [place] 2016  Diagnoses: anxiety  History of outpatient therapy:  a few times that did not work out;some language barrier  Previous Inpatient hospitalizations: none  Previous medication trials:   TCA  Celexa-dizzy  History of suicide/self harm attempts: none  Medications:     Current Outpatient Medications:     acetaminophen (TYLENOL) 500 MG tablet, Take 1 tablet by mouth Every 6 (Six) Hours As Needed for Mild Pain., Disp: , Rfl:     calcium carbonate (TUMS) 500 MG chewable tablet, Chew 1  "tablet Daily As Needed for Indigestion or Heartburn., Disp: , Rfl:     drospirenone-ethinyl estradiol (MARITZA) 3-0.02 MG per tablet, Take 1 tablet by mouth Daily. (Patient taking differently: Take 1 tablet by mouth Daily. PT has not started yet due to SE concerns), Disp: 84 tablet, Rfl: 0    esomeprazole (nexIUM) 20 MG capsule, Take 1 capsule by mouth Every Morning Before Breakfast., Disp: , Rfl:     vitamin D (ERGOCALCIFEROL) 1.25 MG (90779 UT) capsule capsule, Take 1 capsule by mouth 1 (One) Time Per Week., Disp: 12 capsule, Rfl: 0    ASHWAGANDHA PO, Take 1 tablet by mouth. (Patient not taking: Reported on 6/20/2025), Disp: , Rfl:     busPIRone (BUSPAR) 5 MG tablet, Take 0.5 tablets by mouth 2 (Two) Times a Day., Disp: 60 tablet, Rfl: 0    Objective     Physical Exam    Vital Signs:   Vitals:    06/20/25 1519   BP: 108/72   Pulse: 85   SpO2: 98%   Weight: 54.7 kg (120 lb 9.6 oz)   Height: 154.9 cm (60.98\")     Body mass index is 22.8 kg/m².       Mental Status Exam:   MENTAL STATUS EXAM   General Appearance:  Cleanly groomed and dressed and well developed  Eye Contact:  Good eye contact  Attitude:  Cooperative  Motor Activity:  Normal gait, posture  Muscle Strength:  Normal  Speech:  Normal rate, tone, volume  Language:  Spontaneous and stereotypical  Mood and affect:  Normal, pleasant  Hopelessness:  Denies  Loneliness: Denies  Thought Process:  Logical and goal-directed  Associations/ Thought Content:  No delusions  Hallucinations:  None  Suicidal Ideations:  Not present  Homicidal Ideation:  Not present  Sensorium:  Alert and clear  Orientation:  Person, place, time and situation  Immediate Recall, Recent, and Remote Memory:  Intact  Attention Span/ Concentration:  Good  Fund of Knowledge:  Appropriate for age and educational level  Intellectual Functioning:  Average range  Insight:  Good  Judgement:  Good  Reliability:  Good  Impulse Control:  Good   "     @RESULASTCBCDIFFPANEL,TSH,LABLIPI,XVKSFIPG86,MXIGSQCM19,MG,FOLATE,PROLACTIN,CRPRESULT,CMP,X2XMVTUVEIK)@    Lab Results   Component Value Date    GLUCOSE 84 01/31/2025    BUN 7 01/31/2025    CREATININE 0.71 01/31/2025    EGFR 115.3 01/31/2025    BCR 9.9 01/31/2025    K 3.9 01/31/2025    CO2 22.4 01/31/2025    CALCIUM 9.1 01/31/2025    ALBUMIN 4.4 02/07/2025    BILITOT 0.6 02/07/2025    AST 12 02/07/2025    ALT <5 02/07/2025       Lab Results   Component Value Date    WBC 2.80 (L) 01/31/2025    HGB 12.3 01/31/2025    HCT 37.2 01/31/2025    MCV 86.3 01/31/2025     01/31/2025       Lab Results   Component Value Date    CHOL 190 02/07/2025    TRIG 85 02/07/2025    HDL 45 02/07/2025     (H) 02/07/2025         Latest Reference Range & Units 01/31/25 10:38   TSH Baseline 0.270 - 4.200 uIU/mL 1.800        Latest Reference Range & Units 07/03/23 13:47   Vitamin B-12 211 - 946 pg/mL 783        Latest Reference Range & Units 11/18/24 14:47   25 Hydroxy, Vitamin D 30.0 - 100.0 ng/ml 20.6 (L)       Results             Assessment / Plan      Visit Diagnosis/Orders Placed This Visit:  Diagnoses and all orders for this visit:    1. Adjustment disorder with mixed anxiety and depressed mood (Primary)  -     busPIRone (BUSPAR) 5 MG tablet; Take 0.5 tablets by mouth 2 (Two) Times a Day.  Dispense: 60 tablet; Refill: 0    2. Trauma and stressor-related disorder  Comments:  continue therapy       Assessment & Plan  1. Anxiety.  She reports feeling better with buspirone 5 mg twice a day but experiences dizziness and excessive sleepiness when increasing the dose. She is advised to continue with buspirone 5 mg once daily and consider splitting the dose to 2.5 mg twice daily to maintain consistent blood levels and potentially improve tolerability. A prescription for buspirone 5 mg tablets will be sent to [facility] on [address] [facility]. She is also encouraged to explore non-pharmacologic options such as L-theanine and Silexan  (lavender-derived supplement) for anxiety management. The potential benefits of magnesium supplementation (200-400 mg daily) were discussed. She is advised to continue her current herbal supplement regimen if it is beneficial. The importance of maintaining a balanced lifestyle, including adequate sleep, a healthy diet, regular exercise, and strong social connections, was emphasized. She is encouraged to persist with her therapy sessions for at least six sessions before evaluating their effectiveness.       Follow-up  The patient will follow up in 2 months.       Plan:   Buspar 2.5 mg bid  Continue therapy  Needs to take her prescribed vit D daily with food     Continue supportive psychotherapy efforts and medications as indicated. Treatment and medication options discussed during today's visit. Patient ackowledged and verbally consented to continue with current treatment plan and was educated on the importance of compliance with treatment and follow-up appointments. Patient seems reasonably able to adhere to treatment plan.      Medication Considerations:  Discussed medication options and treatment plan of prescribed medication(s) as well as the risks, benefits, and potential side effects. Patient is agreeable to call the office with any worsening of symptoms or onset of side effects. Patient is agreeable to call 911 or go to the nearest ER should he/she begin having SI/HI.    Quality Measures:   Never smoker    I advised Reyes Peguero of the risks of tobacco use.     Follow Up:   Return in about 2 months (around 8/20/2025).      GUS Desouza, [facility]    Patient or patient representative verbalized consent for the use of Ambient Listening during the visit with  GUS Richardson for chart documentation. 6/20/2025  16:18 EDT    Answers submitted by the patient for this visit:  Problem not listed (Submitted on 6/18/2025)  Chief Complaint: Other medical problem  anorexia:  No  joint pain: No  change in stool: No  joint swelling: No  swollen glands: No  vertigo: No  visual change: No  Onset: 6 to 12 months

## 2025-06-24 ENCOUNTER — TELEMEDICINE (OUTPATIENT)
Dept: PSYCHIATRY | Facility: CLINIC | Age: 35
End: 2025-06-24

## 2025-06-24 DIAGNOSIS — F33.2 SEVERE EPISODE OF RECURRENT MAJOR DEPRESSIVE DISORDER, WITHOUT PSYCHOTIC FEATURES: ICD-10-CM

## 2025-06-24 DIAGNOSIS — F43.23 ADJUSTMENT DISORDER WITH MIXED ANXIETY AND DEPRESSED MOOD: Primary | ICD-10-CM

## 2025-06-24 DIAGNOSIS — F41.1 GENERALIZED ANXIETY DISORDER: ICD-10-CM

## 2025-06-24 NOTE — PROGRESS NOTES
[facility]   Follow-up Progress Note     Date: June 24, 2025  Time In: 1:37  Time Out: 2:37      PROGRESS NOTE  Data:  Reyes Peguero is a 34 y.o. female presenting to [facility] (through [facility]), [address], [address] using a secure MyChart Video Visit through [facility] for assessment with Amarjit Mckeon LCSW. The patient is seen remotely in their home using [facility] My Chart. Patient is being seen via telehealth and stated they are in a secure environment for this session. The patient's condition being diagnosed/treated is appropriate for telemedicine. The provider identified herself as well as her credentials. The patient and/or patients guardian consent to be seen remotely, and when consent is given they understand that the consent allows for patient identifiable information to be sent to a third party as needed. They may refuse to be seen remotely at any time. The electronic data is encrypted and password protected, and the patient has been advised of the potential risks to privacy not withstanding such measures.    Today Patient expressed that she has continued to struggled to find employment. Patient stated she has been receiving positive feedback but the barrier has been being able to relocate in a timely manner. Patient stated she has been trying to focus on retaking her exam. Patient stated she has given herself the next two month to prioritizing studying then seeking employment. Patient stated she has been trying to change her mindset to be more positive. Patient reported she did start taking her medication and that has helped with managing her emotions better. Patient expressed frustration that her test results from her surgery were wrong and she feels she has surgery unnecessarily. Patient discussed her anger associated to her scar that she has to the situations she was  put through. Processed with patient her thoughts and emotions. Discussed with patient utilizing journaling to express her emotions and discussed with patient how she can change her perspective of her scar.       Clinical Maneuvering/Intervention:    Chief Complaint: adjustment, anxiety, depression    (Scales based on 0 - 10 with 10 being the worst)  Depression: 4 Anxiety: 4     Assisted Patient in processing above session content; acknowledged and normalized patient’s thoughts, feelings, and concerns.  Rationalized patient thought process regarding navigating seeking a job and studying for her license exam.  Discussed triggers associated with patient's anxiety.  Also discussed coping skills for patient to implement such as journaling and focusing on what is within patient's control.    Allowed Patient to freely discuss issues  without interruption or judgement with unconditional positive regard, active listening skills, and empathy. Therapist provided a safe, confidential environment to facilitate the development of a positive therapeutic relationship and encouraged open, honest communication. Assisted Patient in identifying risk factors which would indicate the need for higher level of care including thoughts to harm self or others and/or self-harming behavior and encouraged Patient to contact this office, call 911, or present to the nearest emergency room should any of these events occur. Discussed crisis intervention services and means to access. Patient adamantly and convincingly denies current suicidal or homicidal ideation or perceptual disturbance. Assisted Patient in processing session content; acknowledged and normalized Patient’s thoughts, feelings, and concerns by utilizing a person-centered approach in efforts to build appropriate rapport and a positive therapeutic relationship with open and honest communication. Therapist utilized dialectical behavior techniques to teach and model emotional regulation  and relaxation methods. Therapist assisted Patient with identifying and implementing healthier coping strategies.     Assessment   Patient appears to be experiencing heightened anxiety and depression in response to COVID-19 epidemic  As a result, they can be reasonably expected to continue to benefit from treatment and would likely be at increased risk for decompensation otherwise.    Mental Status Exam:   Hygiene:   fair  Cooperation:  Cooperative  Eye Contact:  Good  Psychomotor Behavior:  Appropriate  Affect:  Full range  Mood: neutral  Speech:  Normal  Thought Process:  Goal directed  Thought Content:  Mood congruent  Suicidal:  None  Homicidal:  None  Hallucinations:  None  Delusion:  None  Memory:  Intact  Orientation:  Person, Place, Time, and Situation  Reliability:  good  Insight:  Good  Judgement:  Good  Impulse Control:  Good  Physical/Medical Issues:  No      PHQ-Score Total:  PHQ-9 Total Score:        Patient's Support Network Includes:  brother, friend    Functional Status: Moderate impairment     Progress toward goal: Not at goal    Prognosis: Good with Ongoing Treatment            Impression/Formulation:    VISIT DIAGNOSIS:     ICD-10-CM ICD-9-CM   1. Adjustment disorder with mixed anxiety and depressed mood  F43.23 309.28   2. Severe episode of recurrent major depressive disorder, without psychotic features  F33.2 296.33   3. Generalized anxiety disorder  F41.1 300.02        Patient appeared alert and oriented.  Patient is voluntarily requesting to continue outpatient therapy at [facility].  Patient is receptive to assistance with maintaining a stable lifestyle.  Patient presents with history of anxiety and depression.  Patient is agreeable to attend routine therapy sessions.  Patient expressed desire to maintain stability and participate in the therapeutic process.        Crisis Plan:  Symptoms and/or behaviors to indicate a crisis: Excessive worry or  fear    What calming techniques or other strategies will patient use to de-esclate and stay safe: slow down, breathe, visualize calming self, think it though, listen to music, change focus, take a walk    Who is one person patient can contact to assist with de-escalation? Friend, brother    If symptoms/behaviors persist, Patient will present to the nearest hospital for an assessment. Advised patient of [facility] and assessment services.     Plan:   Patient will continue in individual outpatient therapy with focus on improved functioning and coping skills, maintaining stability, and avoiding decompensation and the need for higher level of care.    Patient will contact this office ([facility] at [phone]), call 911 or present to the nearest emergency room should suicidal or homicidal ideations occur. Provide Cognitive Behavioral Therapy and Solution Focused Therapy to improve functioning, maintain stability, and avoid decompensation and the need for higher level of care.     Return in about 3 weeks, or earlier if symptoms worsen or fail to improve.    Recommended Referrals: none        This document has been electronically signed by Amarjit Mckeon LCSW  June 24, 2025 13:37 EDT        Part of this note may be an electronic transcription/translation of spoken language to printed text using the Dragon Dictation System.    Mode of Visit: Video  Location of patient: -HOME-  Location of provider: +HOME+  You have chosen to receive care through a telehealth visit.  The patient has signed the video visit consent form.  The visit included audio and video interaction. No technical issues occurred during this visit.

## 2025-07-11 ENCOUNTER — OFFICE VISIT (OUTPATIENT)
Dept: OBSTETRICS AND GYNECOLOGY | Facility: CLINIC | Age: 35
End: 2025-07-11

## 2025-07-11 VITALS
DIASTOLIC BLOOD PRESSURE: 60 MMHG | WEIGHT: 117 LBS | SYSTOLIC BLOOD PRESSURE: 100 MMHG | RESPIRATION RATE: 16 BRPM | BODY MASS INDEX: 22.12 KG/M2

## 2025-07-11 DIAGNOSIS — Z30.41 SURVEILLANCE OF CONTRACEPTIVE PILL: Primary | ICD-10-CM

## 2025-07-11 NOTE — PROGRESS NOTES
Subjective   Chief Complaint   Patient presents with    Follow-up     Ocp follow up ( stop ocp due to headache)     Reyes Peguero is a 34 y.o. year old No obstetric history on file..  Patient's last menstrual period was 07/07/2025.  She presents to be seen because of follow up from starting a TIRSO. She was very concerned taking it because of side effects. She took 1 pill- and then stopped it. She reports concerns regarding increased risk of breast cancer with a history of breast fibroadenoma She additionally started buspar prescribed by her behavioral health APRN and reports her mid-cycle spotting seemed to improve. Discussed this could be due to stress causing spotting and alleviating that could be improving her cycles.     History of Present Illness         OTHER THINGS SHE WANTS TO DISCUSS TODAY:  Nothing else    The following portions of the patient's history were reviewed and updated as appropriate:current medications and allergies    Social History    Tobacco Use      Smoking status: Never      Smokeless tobacco: Never      Review of Systems        Objective   /60   Resp 16   Wt 53.1 kg (117 lb)   LMP 07/07/2025   BMI 22.12 kg/m²     Physical Exam  Vitals and nursing note reviewed.   Psychiatric:         Mood and Affect: Mood normal.         Behavior: Behavior normal.         Thought Content: Thought content normal.         Judgment: Judgment normal.         Physical Exam         Lab Review   No data reviewed    Imaging   No data reviewed     Results            Assessment & Plan   Diagnoses and all orders for this visit:    1. Surveillance of contraceptive pill (Primary)    Discussed potential side effects of COCs, and also offered POP- she declines any intervention today for menses. Discussed potential for OTC / supplements to support menstrual cycles. Additionally suspect she would do well with luteal phase support regarding reported increased anxiety symptoms during luteal phase.  She additionally declines that today. Will have her back for annual exam and/or PRN.     The importance of keeping all planned follow-up and taking all medications as prescribed was emphasized.    Return in about 9 months (around 4/13/2026) for Annual physical.    No orders of the defined types were placed in this encounter.                Assessment & Plan         This note was electronically signed.    Mone Winslow, APRN  July 14, 2025

## 2025-07-21 ENCOUNTER — TELEMEDICINE (OUTPATIENT)
Dept: PSYCHIATRY | Facility: CLINIC | Age: 35
End: 2025-07-21
Payer: MEDICAID

## 2025-07-21 DIAGNOSIS — F43.23 ADJUSTMENT DISORDER WITH MIXED ANXIETY AND DEPRESSED MOOD: Primary | ICD-10-CM

## 2025-07-21 DIAGNOSIS — F33.2 SEVERE EPISODE OF RECURRENT MAJOR DEPRESSIVE DISORDER, WITHOUT PSYCHOTIC FEATURES: ICD-10-CM

## 2025-07-21 DIAGNOSIS — F41.1 GENERALIZED ANXIETY DISORDER: ICD-10-CM

## 2025-07-21 NOTE — PROGRESS NOTES
"[facility]   Follow-up Progress Note     Date: July 21, 2025  Time In: 9:01  Time Out: 9:59      PROGRESS NOTE  Data:  Reyes Peguero is a 34 y.o. female presenting to [facility] (through [facility]), [address], [address] using a secure MyChart Video Visit through [facility] for assessment with Amarjit Mckeon LCSW. The patient is seen remotely in their home using [facility] My Chart. Patient is being seen via telehealth and stated they are in a secure environment for this session. The patient's condition being diagnosed/treated is appropriate for telemedicine. The provider identified herself as well as her credentials. The patient and/or patients guardian consent to be seen remotely, and when consent is given they understand that the consent allows for patient identifiable information to be sent to a third party as needed. They may refuse to be seen remotely at any time. The electronic data is encrypted and password protected, and the patient has been advised of the potential risks to privacy not withstanding such measures.    Today Patient reported she is still looking for job and studying for her exam. Patient stated she has interviewed for a few jobs but haven't had any success. Patient reported she is optimistic about interviewing for a job in [place] later this week. Patient discussed that she is struggling to label a relationship with a peer. Patient stated she attempted to discuss the definition of their relationship but he was hesitant to label their relationship. Patient stated she is uncertain if she should let go of the relationship and focus on her career. Patient stated one of friends expressed patient is \"in a rush.\" Patient processed her need to be in control and when she doesn't feel in control it causes patient anxiety. Patient was engaged in learning grounding skill " to refocus on the present to decrease anxiety about what is not within her control.       Clinical Maneuvering/Intervention:    Chief Complaint: adjustment, anxiety, depression    (Scales based on 0 - 10 with 10 being the worst)  Depression: 3 Anxiety: 8     Assisted Patient in processing above session content; acknowledged and normalized patient’s thoughts, feelings, and concerns.  Rationalized patient thought process regarding defining a relationship.  Discussed triggers associated with patient's anxiety.  Also discussed coping skills for patient to implement such as grounding.    Allowed Patient to freely discuss issues  without interruption or judgement with unconditional positive regard, active listening skills, and empathy. Therapist provided a safe, confidential environment to facilitate the development of a positive therapeutic relationship and encouraged open, honest communication. Assisted Patient in identifying risk factors which would indicate the need for higher level of care including thoughts to harm self or others and/or self-harming behavior and encouraged Patient to contact this office, call 911, or present to the nearest emergency room should any of these events occur. Discussed crisis intervention services and means to access. Patient adamantly and convincingly denies current suicidal or homicidal ideation or perceptual disturbance. Assisted Patient in processing session content; acknowledged and normalized Patient’s thoughts, feelings, and concerns by utilizing a person-centered approach in efforts to build appropriate rapport and a positive therapeutic relationship with open and honest communication. Therapist utilized dialectical behavior techniques to teach and model emotional regulation and relaxation methods. Therapist assisted Patient with identifying and implementing healthier coping strategies.     Assessment   Patient appears to be experiencing heightened anxiety and depression in  response to COVID-19 epidemic  As a result, they can be reasonably expected to continue to benefit from treatment and would likely be at increased risk for decompensation otherwise.    Mental Status Exam:   Hygiene:   fair  Cooperation:  Cooperative  Eye Contact:  Good  Psychomotor Behavior:  Appropriate  Affect:  Full range  Mood: anxious  Speech:  Normal  Thought Process:  Goal directed  Thought Content:  Mood congruent  Suicidal:  None  Homicidal:  None  Hallucinations:  None  Delusion:  None  Memory:  Intact  Orientation:  Person, Place, Time, and Situation  Reliability:  good  Insight:  Good  Judgement:  Good  Impulse Control:  Good  Physical/Medical Issues:  No      PHQ-Score Total:  PHQ-9 Total Score:        Patient's Support Network Includes:  brother, friend    Functional Status: Moderate impairment     Progress toward goal: Not at goal    Prognosis: Good with Ongoing Treatment            Impression/Formulation:    VISIT DIAGNOSIS:     ICD-10-CM ICD-9-CM   1. Adjustment disorder with mixed anxiety and depressed mood  F43.23 309.28   2. Severe episode of recurrent major depressive disorder, without psychotic features  F33.2 296.33   3. Generalized anxiety disorder  F41.1 300.02        Patient appeared alert and oriented.  Patient is voluntarily requesting to continue outpatient therapy at [facility].  Patient is receptive to assistance with maintaining a stable lifestyle.  Patient presents with history of anxiety and depression.  Patient is agreeable to attend routine therapy sessions.  Patient expressed desire to maintain stability and participate in the therapeutic process.        Crisis Plan:  Symptoms and/or behaviors to indicate a crisis: Excessive worry or fear    What calming techniques or other strategies will patient use to de-esclate and stay safe: slow down, breathe, visualize calming self, think it though, listen to music, change focus, take a walk    Who is  one person patient can contact to assist with de-escalation? Friend, brother    If symptoms/behaviors persist, Patient will present to the nearest hospital for an assessment. Advised patient of [facility] and assessment services.     Plan:   Patient will continue in individual outpatient therapy with focus on improved functioning and coping skills, maintaining stability, and avoiding decompensation and the need for higher level of care.    Patient will contact this office ([facility] at [phone]), call 911 or present to the nearest emergency room should suicidal or homicidal ideations occur. Provide Cognitive Behavioral Therapy and Solution Focused Therapy to improve functioning, maintain stability, and avoid decompensation and the need for higher level of care.     Return in about 3 weeks, or earlier if symptoms worsen or fail to improve.    Recommended Referrals: none        This document has been electronically signed by Amarjit Mckeon LCSW  July 21, 2025 09:01 EDT        Part of this note may be an electronic transcription/translation of spoken language to printed text using the Dragon Dictation System.    Mode of Visit: Video  Location of patient: -HOME-  Location of provider: +HOME+  You have chosen to receive care through a telehealth visit.  The patient has signed the video visit consent form.  The visit included audio and video interaction. No technical issues occurred during this visit.

## 2025-08-11 ENCOUNTER — TELEMEDICINE (OUTPATIENT)
Dept: PSYCHIATRY | Facility: CLINIC | Age: 35
End: 2025-08-11

## 2025-08-11 DIAGNOSIS — F33.2 SEVERE EPISODE OF RECURRENT MAJOR DEPRESSIVE DISORDER, WITHOUT PSYCHOTIC FEATURES: ICD-10-CM

## 2025-08-11 DIAGNOSIS — F43.23 ADJUSTMENT DISORDER WITH MIXED ANXIETY AND DEPRESSED MOOD: Primary | ICD-10-CM

## 2025-08-11 DIAGNOSIS — F41.1 GENERALIZED ANXIETY DISORDER: ICD-10-CM

## 2025-08-22 ENCOUNTER — OFFICE VISIT (OUTPATIENT)
Age: 35
End: 2025-08-22

## 2025-08-22 VITALS
DIASTOLIC BLOOD PRESSURE: 66 MMHG | WEIGHT: 120.6 LBS | HEART RATE: 90 BPM | HEIGHT: 61 IN | OXYGEN SATURATION: 98 % | SYSTOLIC BLOOD PRESSURE: 102 MMHG | BODY MASS INDEX: 22.77 KG/M2

## 2025-08-22 DIAGNOSIS — F51.09 SITUATIONAL INSOMNIA: ICD-10-CM

## 2025-08-22 DIAGNOSIS — F43.23 ADJUSTMENT DISORDER WITH MIXED ANXIETY AND DEPRESSED MOOD: Primary | ICD-10-CM
